# Patient Record
Sex: FEMALE | Race: BLACK OR AFRICAN AMERICAN | NOT HISPANIC OR LATINO | Employment: STUDENT | URBAN - METROPOLITAN AREA
[De-identification: names, ages, dates, MRNs, and addresses within clinical notes are randomized per-mention and may not be internally consistent; named-entity substitution may affect disease eponyms.]

---

## 2020-03-06 ENCOUNTER — TELEPHONE (OUTPATIENT)
Dept: OBSTETRICS AND GYNECOLOGY | Facility: CLINIC | Age: 30
End: 2020-03-06

## 2020-03-06 DIAGNOSIS — Z30.2 ENCOUNTER FOR STERILIZATION: Primary | ICD-10-CM

## 2020-03-10 ENCOUNTER — OFFICE VISIT (OUTPATIENT)
Dept: OBSTETRICS AND GYNECOLOGY | Facility: CLINIC | Age: 30
End: 2020-03-10
Payer: COMMERCIAL

## 2020-03-10 VITALS — HEIGHT: 64 IN | BODY MASS INDEX: 49.68 KG/M2 | WEIGHT: 291 LBS

## 2020-03-10 DIAGNOSIS — E66.01 MORBID OBESITY WITH BMI OF 45.0-49.9, ADULT: ICD-10-CM

## 2020-03-10 DIAGNOSIS — Z01.419 ENCOUNTER FOR GYNECOLOGICAL EXAMINATION WITHOUT ABNORMAL FINDING: Primary | ICD-10-CM

## 2020-03-10 DIAGNOSIS — N89.8 VAGINAL DISCHARGE: ICD-10-CM

## 2020-03-10 PROCEDURE — 87481 CANDIDA DNA AMP PROBE: CPT | Mod: 59

## 2020-03-10 PROCEDURE — 87624 HPV HI-RISK TYP POOLED RSLT: CPT

## 2020-03-10 PROCEDURE — 99999 PR PBB SHADOW E&M-EST. PATIENT-LVL II: CPT | Mod: PBBFAC,,, | Performed by: OBSTETRICS & GYNECOLOGY

## 2020-03-10 PROCEDURE — 87661 TRICHOMONAS VAGINALIS AMPLIF: CPT

## 2020-03-10 PROCEDURE — 99385 PREV VISIT NEW AGE 18-39: CPT | Mod: S$GLB,,, | Performed by: OBSTETRICS & GYNECOLOGY

## 2020-03-10 PROCEDURE — 99999 PR PBB SHADOW E&M-EST. PATIENT-LVL II: ICD-10-PCS | Mod: PBBFAC,,, | Performed by: OBSTETRICS & GYNECOLOGY

## 2020-03-10 PROCEDURE — 87591 N.GONORRHOEAE DNA AMP PROB: CPT

## 2020-03-10 PROCEDURE — 99385 PR PREVENTIVE VISIT,NEW,18-39: ICD-10-PCS | Mod: S$GLB,,, | Performed by: OBSTETRICS & GYNECOLOGY

## 2020-03-10 PROCEDURE — 88175 CYTOPATH C/V AUTO FLUID REDO: CPT

## 2020-03-11 ENCOUNTER — TELEPHONE (OUTPATIENT)
Dept: OBSTETRICS AND GYNECOLOGY | Facility: CLINIC | Age: 30
End: 2020-03-11

## 2020-03-11 LAB
BACTERIAL VAGINOSIS DNA: POSITIVE
C TRACH DNA SPEC QL NAA+PROBE: NOT DETECTED
CANDIDA GLABRATA DNA: NEGATIVE
CANDIDA KRUSEI DNA: NEGATIVE
CANDIDA RRNA VAG QL PROBE: NEGATIVE
N GONORRHOEA DNA SPEC QL NAA+PROBE: NOT DETECTED
T VAGINALIS RRNA GENITAL QL PROBE: POSITIVE

## 2020-03-11 NOTE — TELEPHONE ENCOUNTER
I spoke to the pt and informed her to wait until her new insurance start and we can restart her process for the IUD.

## 2020-03-11 NOTE — TELEPHONE ENCOUNTER
----- Message from Graeme Jean sent at 3/11/2020  3:48 PM CDT -----  Contact: NEOMI GRANADO [[id number]]  Name of Who is Calling: NOEMI GRANADO [[id number]]      What is the request in detail: Would like to speak with staff in regards to knowing if she she needs to do the IUD process over again in April, when her insurance changed on the 1st. Please advise      Can the clinic reply by [facility]: yes      What [facility] to [facility] Back [facility]: [phone]

## 2020-03-12 ENCOUNTER — PATIENT MESSAGE (OUTPATIENT)
Dept: OBSTETRICS AND GYNECOLOGY | Facility: CLINIC | Age: 30
End: 2020-03-12

## 2020-03-12 RX ORDER — METRONIDAZOLE 500 MG/1
500 TABLET ORAL 2 TIMES DAILY
Qty: 14 TABLET | Refills: 0 | Status: SHIPPED | OUTPATIENT
Start: 2020-03-12 | End: 2020-03-19

## 2020-03-17 LAB
HPV HR 12 DNA SPEC QL NAA+PROBE: NEGATIVE
HPV16 AG SPEC QL: NEGATIVE
HPV18 DNA SPEC QL NAA+PROBE: NEGATIVE

## 2020-03-23 NOTE — PROGRESS NOTES
HISTORY OF PRESENT ILLNESS:    Jennifer Sanches is a 29 y.o. female, , Patient's last menstrual period was 2020.,  presents for a routine exam and has no complaints.    Past Medical History:   Diagnosis Date    Abnormal Pap smear     colpo    Abnormal Pap smear of cervix        Past Surgical History:   Procedure Laterality Date    WISDOM TOOTH EXTRACTION         MEDICATIONS AND ALLERGIES:      Current Outpatient Medications:     hydrocodone-acetaminophen 5-325mg (NORCO) 5-325 mg per tablet, Take 1 tablet by mouth every 4 (four) hours as needed for Pain., Disp: , Rfl:     levonorgestrel (MIRENA) 20 mcg/24 hr (5 years) IUD, 1 each by Intrauterine route once., Disp: , Rfl:     Review of patient's allergies indicates:  No Known Allergies    Family History   Problem Relation Age of Onset    Hypertension Mother     Hypertension Father     No Known Problems Sister     Alzheimer's disease Paternal Grandmother     Breast cancer Neg Hx     Colon cancer Neg Hx     Ovarian cancer Neg Hx        Social History     Socioeconomic History    Marital status: Single     Spouse name: Not on file    Number of children: Not on file    Years of education: Not on file    Highest education level: Not on file   Occupational History    Not on file   Social Needs    Financial resource strain: Not on file    Food insecurity:     Worry: Not on file     Inability: Not on file    Transportation needs:     Medical: Not on file     Non-medical: Not on file   Tobacco Use    Smoking status: Never Smoker    Smokeless tobacco: Never Used   Substance and Sexual Activity    Alcohol use: No     Comment: 2 x month    Drug use: No    Sexual activity: Yes     Partners: Male     Birth control/protection: IUD   Lifestyle    Physical activity:     Days per week: Not on file     Minutes per session: Not on file    Stress: Not on file   Relationships    Social connections:     Talks on phone: Not on file     Gets together:  "Not on file     Attends Jewish service: Not on file     Active member of club or organization: Not on file     Attends meetings of clubs or organizations: Not on file     Relationship status: Not on file   Other Topics Concern    Not on file   Social History Narrative    Not on file       COMPREHENSIVE GYN HISTORY:  PAP History: Denies abnormal Paps.  Infection History: Denies STDs. Denies PID.  Benign History: Denies uterine fibroids. Denies ovarian cysts. Denies endometriosis. Denies other conditions.  Cancer History: Denies cervical cancer. Denies uterine cancer or hyperplasia. Denies ovarian cancer. Denies vulvar cancer or pre-cancer. Denies vaginal cancer or pre-cancer. Denies breast cancer. Denies colon cancer.  Sexual Activity History: Reports currently being sexually active  Menstrual History: Monthly. Mod then light flow.   Dysmenorrhea History: Reports mild dysmenorrhea.       ROS:  GENERAL: No weight changes. No swelling. No fatigue. No fever.  CARDIOVASCULAR: No chest pain. No shortness of breath. No leg cramps.   NEUROLOGICAL: No headaches. No vision changes.  BREASTS: No pain. No lumps. No discharge.  ABDOMEN: No pain. No nausea. No vomiting. No diarrhea. No constipation.  REPRODUCTIVE: No abnormal bleeding.   VULVA: No pain. No lesions. No itching.  VAGINA: No relaxation. No itching. No odor. No discharge. No lesions.  URINARY: No incontinence. No nocturia. No frequency. No dysuria.    Ht 5' 4" (1.626 m)   Wt 132 kg (291 lb 0.1 oz)   LMP 02/18/2020   BMI 49.95 kg/m²     PE:  APPEARANCE: Well nourished, well developed, in no acute distress.  AFFECT: WNL, alert and oriented x 3.  SKIN: No acne or hirsutism.  NECK: Neck symmetric, without masses or thyromegaly.  NODES: No inguinal, cervical, axillary or femoral lymph node enlargement.  CHEST: Good respiratory effort.   ABDOMEN: Soft. No tenderness or masses. No hepatosplenomegaly. No hernias.  BREASTS: Symmetrical, no skin changes, visible " lesions, palpable masses or nipple discharge bilaterally.  PELVIC: External female genitalia without lesions.  Female hair distribution. Adequate perineal body, Normal urethral meatus. Vagina moist and well rugated without lesions or discharge.  No significant cystocele or rectocele present. Cervix pink without lesions, discharge or tenderness. Uterus is 4-6 week size, regular, mobile and nontender. Adnexa without masses or tenderness.  EXTREMITIES: No edema    DIAGNOSIS:  1. Encounter for gynecological examination without abnormal finding    2. Vaginal discharge    3. Morbid obesity with BMI of 45.0-49.9, adult        PLAN:    Orders Placed This Encounter    HPV High Risk Genotypes, PCR    Vaginosis Screen by DNA Probe    C. trachomatis/N. gonorrhoeae by AMP DNA    Liquid-Based Pap Smear, Screening       COUNSELING:  The patient was counseled today on:  -A.C.S. Pap and pelvic exam guidelines (pap every 3 years), recomendations for yearly mammogram;  -to follow up with her PCP for other health maintenance.    FOLLOW-UP with me annually.

## 2020-04-02 ENCOUNTER — PATIENT MESSAGE (OUTPATIENT)
Dept: OBSTETRICS AND GYNECOLOGY | Facility: CLINIC | Age: 30
End: 2020-04-02

## 2020-04-02 LAB
FINAL PATHOLOGIC DIAGNOSIS: NORMAL
Lab: NORMAL

## 2020-04-03 ENCOUNTER — OFFICE VISIT (OUTPATIENT)
Dept: OBSTETRICS AND GYNECOLOGY | Facility: CLINIC | Age: 30
End: 2020-04-03
Payer: COMMERCIAL

## 2020-04-03 DIAGNOSIS — Z30.9 ENCOUNTER FOR CONTRACEPTIVE MANAGEMENT, UNSPECIFIED TYPE: Primary | ICD-10-CM

## 2020-04-03 PROCEDURE — 99213 PR OFFICE/OUTPT VISIT, EST, LEVL III, 20-29 MIN: ICD-10-PCS | Mod: 95,,, | Performed by: OBSTETRICS & GYNECOLOGY

## 2020-04-03 PROCEDURE — 99213 OFFICE O/P EST LOW 20 MIN: CPT | Mod: 95,,, | Performed by: OBSTETRICS & GYNECOLOGY

## 2020-04-03 NOTE — PROGRESS NOTES
HISTORY OF PRESENT ILLNESS:    Jennifer Sanches is a 29 y.o. female  No LMP recorded. (Menstrual status: Birth Control). presents today for follow-up to discuss contraception.    Patient is single. New relationship. Tooks meds for trichomonas, partner not treated.   MIrena IUD in place, finished Flagyl.  Long discussion held with patient secondary to the criteria for IUD placement and being in a mutually monogamous relationship a low risk for STDs.  Disucssed other options for birth control. Patient desires Nexplanon.     Past Medical History:   Diagnosis Date    Abnormal Pap smear     colpo    Abnormal Pap smear of cervix        Past Surgical History:   Procedure Laterality Date    WISDOM TOOTH EXTRACTION         MEDICATIONS AND ALLERGIES:      Current Outpatient Medications:     hydrocodone-acetaminophen 5-325mg (NORCO) 5-325 mg per tablet, Take 1 tablet by mouth every 4 (four) hours as needed for Pain., Disp: , Rfl:     levonorgestrel (MIRENA) 20 mcg/24 hr (5 years) IUD, 1 each by Intrauterine route once., Disp: , Rfl:     Review of patient's allergies indicates:  No Known Allergies    COMPREHENSIVE GYN HISTORY:  PAP History: Denies abnormal Paps.  Infection History: Denies STDs. Denies PID.  Benign History: Denies uterine fibroids. Denies ovarian cysts. Denies endometriosis. Denies other conditions.  Cancer History: Denies cervical cancer. Denies uterine cancer or hyperplasia. Denies ovarian cancer. Denies vulvar cancer or pre-cancer. Denies vaginal cancer or pre-cancer. Denies breast cancer. Denies colon cancer.  Sexual Activity History: Reports currently being sexually active  Menstrual History: Every 28 days, flows for 4 days. Light flow.  Dysmenorrhea History: Denies dysmenorrhea.    ROS:  GENERAL: No fever or chills.  BREASTS: No pain. No lumps. No discharge.  ABDOMEN: No pain. No nausea. No vomiting. No diarrhea. No constipation.  REPRODUCTIVE: No abnormal bleeding.   VULVA: No pain. No  lesions. No itching.  VAGINA: No relaxation. No itching. No odor. No discharge. No lesions.  URINARY: No incontinence. No nocturia. No frequency. No dysuria.    PE:  APPEARANCE: Well nourished, well developed, in no acute distress.  AFFECT: WNL, alert and oriented x 3.  Deferred     1. Encounter for contraceptive management, unspecified type        COUNSELING:  The patient was counseled today on:    FOLLOW-UP with me in 3 weeks for test of cure.

## 2020-04-06 ENCOUNTER — TELEPHONE (OUTPATIENT)
Dept: OBSTETRICS AND GYNECOLOGY | Facility: CLINIC | Age: 30
End: 2020-04-06

## 2020-04-06 DIAGNOSIS — Z30.9 ENCOUNTER FOR CONTRACEPTIVE MANAGEMENT, UNSPECIFIED TYPE: Primary | ICD-10-CM

## 2020-04-22 ENCOUNTER — OFFICE VISIT (OUTPATIENT)
Dept: OBSTETRICS AND GYNECOLOGY | Facility: CLINIC | Age: 30
End: 2020-04-22
Payer: COMMERCIAL

## 2020-04-22 ENCOUNTER — LAB VISIT (OUTPATIENT)
Dept: LAB | Facility: OTHER | Age: 30
End: 2020-04-22
Attending: OBSTETRICS & GYNECOLOGY
Payer: COMMERCIAL

## 2020-04-22 VITALS — WEIGHT: 286.63 LBS | BODY MASS INDEX: 48.93 KG/M2 | HEIGHT: 64 IN

## 2020-04-22 DIAGNOSIS — Z11.3 SCREENING EXAMINATION FOR STD (SEXUALLY TRANSMITTED DISEASE): Primary | ICD-10-CM

## 2020-04-22 DIAGNOSIS — Z11.3 SCREENING EXAMINATION FOR STD (SEXUALLY TRANSMITTED DISEASE): ICD-10-CM

## 2020-04-22 DIAGNOSIS — Z30.432 ENCOUNTER FOR IUD REMOVAL: ICD-10-CM

## 2020-04-22 DIAGNOSIS — Z30.9 ENCOUNTER FOR CONTRACEPTIVE MANAGEMENT, UNSPECIFIED TYPE: ICD-10-CM

## 2020-04-22 PROCEDURE — 99999 PR PBB SHADOW E&M-EST. PATIENT-LVL II: ICD-10-PCS | Mod: PBBFAC,,, | Performed by: OBSTETRICS & GYNECOLOGY

## 2020-04-22 PROCEDURE — 80074 ACUTE HEPATITIS PANEL: CPT

## 2020-04-22 PROCEDURE — 87481 CANDIDA DNA AMP PROBE: CPT | Mod: 59

## 2020-04-22 PROCEDURE — 99213 OFFICE O/P EST LOW 20 MIN: CPT | Mod: 25,S$GLB,, | Performed by: OBSTETRICS & GYNECOLOGY

## 2020-04-22 PROCEDURE — 87491 CHLMYD TRACH DNA AMP PROBE: CPT | Mod: 59

## 2020-04-22 PROCEDURE — 99999 PR PBB SHADOW E&M-EST. PATIENT-LVL II: CPT | Mod: PBBFAC,,, | Performed by: OBSTETRICS & GYNECOLOGY

## 2020-04-22 PROCEDURE — 86703 HIV-1/HIV-2 1 RESULT ANTBDY: CPT

## 2020-04-22 PROCEDURE — 3008F BODY MASS INDEX DOCD: CPT | Mod: CPTII,S$GLB,, | Performed by: OBSTETRICS & GYNECOLOGY

## 2020-04-22 PROCEDURE — 36415 COLL VENOUS BLD VENIPUNCTURE: CPT

## 2020-04-22 PROCEDURE — 99213 PR OFFICE/OUTPT VISIT, EST, LEVL III, 20-29 MIN: ICD-10-PCS | Mod: 25,S$GLB,, | Performed by: OBSTETRICS & GYNECOLOGY

## 2020-04-22 PROCEDURE — 86592 SYPHILIS TEST NON-TREP QUAL: CPT

## 2020-04-22 PROCEDURE — 58301 PR REMOVE, INTRAUTERINE DEVICE: ICD-10-PCS | Mod: S$GLB,,, | Performed by: OBSTETRICS & GYNECOLOGY

## 2020-04-22 PROCEDURE — 3008F PR BODY MASS INDEX (BMI) DOCUMENTED: ICD-10-PCS | Mod: CPTII,S$GLB,, | Performed by: OBSTETRICS & GYNECOLOGY

## 2020-04-22 PROCEDURE — 58301 REMOVE INTRAUTERINE DEVICE: CPT | Mod: S$GLB,,, | Performed by: OBSTETRICS & GYNECOLOGY

## 2020-04-23 LAB
C TRACH DNA SPEC QL NAA+PROBE: NOT DETECTED
HAV IGM SERPL QL IA: NEGATIVE
HBV CORE IGM SERPL QL IA: NEGATIVE
HBV SURFACE AG SERPL QL IA: NEGATIVE
HCV AB SERPL QL IA: NEGATIVE
HIV 1+2 AB+HIV1 P24 AG SERPL QL IA: NEGATIVE
N GONORRHOEA DNA SPEC QL NAA+PROBE: NOT DETECTED
RPR SER QL: NORMAL

## 2020-04-24 ENCOUNTER — TELEPHONE (OUTPATIENT)
Dept: OBSTETRICS AND GYNECOLOGY | Facility: CLINIC | Age: 30
End: 2020-04-24

## 2020-04-24 DIAGNOSIS — A59.01 VAGINAL TRICHOMONIASIS: Primary | ICD-10-CM

## 2020-04-24 DIAGNOSIS — Z30.017 ENCOUNTER FOR INITIAL PRESCRIPTION OF NEXPLANON: Primary | ICD-10-CM

## 2020-04-24 LAB
BACTERIAL VAGINOSIS DNA: POSITIVE
CANDIDA GLABRATA DNA: NEGATIVE
CANDIDA KRUSEI DNA: NEGATIVE
CANDIDA RRNA VAG QL PROBE: NEGATIVE
T VAGINALIS RRNA GENITAL QL PROBE: POSITIVE

## 2020-04-24 RX ORDER — METRONIDAZOLE 500 MG/1
500 TABLET ORAL 2 TIMES DAILY
Qty: 14 TABLET | Refills: 0 | Status: SHIPPED | OUTPATIENT
Start: 2020-04-24 | End: 2020-05-01

## 2020-04-24 NOTE — PROGRESS NOTES
HISTORY OF PRESENT ILLNESS:    Jennifer Sanches is a 29 y.o. female  No LMP recorded. (Menstrual status: Birth Control). presents today test to cure after treatment for Trichomonas.  IUD has been in place for over 5 years planned removal today.  The use of the oral contraceptive, Depo Provera, Nexplanon, Nuva Ring and IUD has been fully discussed with the patient. Warnings about anticipated minor side effects such as breakthrough spotting, nausea, breast tenderness, weight changes, acne, headaches, etc. She has been told of the more serious potential side effects such as MI, stroke, and deep vein thrombosis, all of which are very unlikely. She has been asked to report any signs of such serious problems immediately.  The need for additional protection, such as a condom, during the first month of taking pills, while taking antibiotics and to prevent exposure to sexually transmitted diseases has also been discussed- the patient has been clearly reminded that contraception cannot protect her against diseases such as HIV and others. She understands and wishes to start Nexplanon.     Past Medical History:   Diagnosis Date    Abnormal Pap smear     colpo    Abnormal Pap smear of cervix        Past Surgical History:   Procedure Laterality Date    WISDOM TOOTH EXTRACTION         MEDICATIONS AND ALLERGIES:      Current Outpatient Medications:     hydrocodone-acetaminophen 5-325mg (NORCO) 5-325 mg per tablet, Take 1 tablet by mouth every 4 (four) hours as needed for Pain., Disp: , Rfl:     Review of patient's allergies indicates:  No Known Allergies    COMPREHENSIVE GYN HISTORY:  PAP History: Denies abnormal Paps.  Infection History: Denies STDs. Denies PID.  Benign History: Denies uterine fibroids. Denies ovarian cysts. Denies endometriosis. Denies other conditions.  Cancer History: Denies cervical cancer. Denies uterine cancer or hyperplasia. Denies ovarian cancer. Denies vulvar cancer or pre-cancer. Denies  vaginal cancer or pre-cancer. Denies breast cancer. Denies colon cancer.  Sexual Activity History: Reports currently being sexually active  Menstrual History: Every 28 days, flows for 4 days. Light flow.  Dysmenorrhea History: Denies dysmenorrhea.    ROS:  GENERAL: No fever or chills.  BREASTS: No pain. No lumps. No discharge.  ABDOMEN: No pain. No nausea. No vomiting. No diarrhea. No constipation.  REPRODUCTIVE: No abnormal bleeding.   VULVA: No pain. No lesions. No itching.  VAGINA: No relaxation. No itching. No odor. No discharge. No lesions.  URINARY: No incontinence. No nocturia. No frequency. No dysuria.    PE:  APPEARANCE: Well nourished, well developed, in no acute distress.  AFFECT: WNL, alert and oriented x 3.  ABDOMEN: Soft. No tenderness or masses. No hepatosplenomegaly. No hernias.  BREASTS: Symmetrical, no skin changes or visible lesions. No palpable masses, nipple discharge bilaterally.  PELVIC: Normal external female genitalia without lesions. Normal hair distribution. Adequate perineal body, normal urethral meatus. Vagina pink and well rugated without lesions or discharge. Cervix pink without lesions, discharge or tenderness. No significant cystocele or rectocele. Bimanual exam shows uterus to be 4-6 weeks size, regular, mobile and nontender. Adnexa without masses or tenderness.      1. Screening examination for STD (sexually transmitted disease)    2. Encounter for IUD removal    3. Encounter for contraceptive management, unspecified type        PLAN:    Orders Placed This Encounter    Vaginosis Screen by DNA Probe    C. trachomatis/N. gonorrhoeae by AMP DNA    Hepatitis Panel, Acute    HIV 1/2 Ag/Ab (4th Gen)    RPR     FOLLOW-UP with me for Nexplanon placement   Answers for HPI/ROS submitted by the patient on 4/22/2020   Gynecologic exam  genital itching: No  genital lesions: No  genital odor: No  genital rash: No  missed menses: No  pelvic pain: No  vaginal bleeding: No  vaginal discharge:  No

## 2020-10-22 ENCOUNTER — PATIENT MESSAGE (OUTPATIENT)
Dept: OBSTETRICS AND GYNECOLOGY | Facility: CLINIC | Age: 30
End: 2020-10-22

## 2020-11-11 ENCOUNTER — TELEPHONE (OUTPATIENT)
Dept: OBSTETRICS AND GYNECOLOGY | Facility: CLINIC | Age: 30
End: 2020-11-11

## 2020-11-11 NOTE — TELEPHONE ENCOUNTER
----- Message from Leah Allen sent at 11/11/2020  1:23 PM CST -----  Type: Patient Call Back    Who called: pt     What is the request in detail: pt calling to check on the status of Mychart message on 10/22. Pt states she would like to have patch instead of IUD. Pt asking for a call back regarding.     Can the clinic reply by [facility]? No     Would the patient rather a call back or a response via [facility]? Call back     Best call back number: [phone] (home)     Additional Information:       Thank You

## 2020-11-13 ENCOUNTER — TELEPHONE (OUTPATIENT)
Dept: OBSTETRICS AND GYNECOLOGY | Facility: CLINIC | Age: 30
End: 2020-11-13

## 2020-11-13 NOTE — TELEPHONE ENCOUNTER
----- Message from Tone Byrne sent at 11/11/2020  3:27 PM CST -----  Patient is returning missed phone call, patient phone number is [phone]. Thanks!

## 2020-11-30 ENCOUNTER — TELEPHONE (OUTPATIENT)
Dept: OBSTETRICS AND GYNECOLOGY | Facility: CLINIC | Age: 30
End: 2020-11-30

## 2020-11-30 NOTE — TELEPHONE ENCOUNTER
----- Message from Tone Byrne sent at 11/30/2020  1:29 PM CST -----  Patient is calling in regards to birth control prescription, the patch. Pt phone number is [phone]. Thanks

## 2020-11-30 NOTE — TELEPHONE ENCOUNTER
Called patient, patient stated she was calling because she has left messages in the past weeks and nothing was sent in for her. Patient stated she was calling because she got her Mirena IUD removed months ago and was going to get the Nexplanon but after doing some research patient stated she does not want the Nexplanon as noted in her chart on 10/22/20. Patient stated she would like a Rx for the birth control patch sent to her pharmacy. Informed patient I would send message to provider for approval.

## 2020-12-08 ENCOUNTER — TELEPHONE (OUTPATIENT)
Dept: OBSTETRICS AND GYNECOLOGY | Facility: CLINIC | Age: 30
End: 2020-12-08

## 2020-12-15 ENCOUNTER — OFFICE VISIT (OUTPATIENT)
Dept: OBSTETRICS AND GYNECOLOGY | Facility: CLINIC | Age: 30
End: 2020-12-15
Payer: COMMERCIAL

## 2020-12-15 DIAGNOSIS — Z30.9 ENCOUNTER FOR CONTRACEPTIVE MANAGEMENT, UNSPECIFIED TYPE: Primary | ICD-10-CM

## 2020-12-15 PROCEDURE — 99212 OFFICE O/P EST SF 10 MIN: CPT | Mod: 95,,, | Performed by: OBSTETRICS & GYNECOLOGY

## 2020-12-15 PROCEDURE — 99212 PR OFFICE/OUTPT VISIT, EST, LEVL II, 10-19 MIN: ICD-10-PCS | Mod: 95,,, | Performed by: OBSTETRICS & GYNECOLOGY

## 2020-12-15 NOTE — PROGRESS NOTES
HISTORY OF PRESENT ILLNESS:    Jennifer Sanches is a 30 y.o. female  No LMP recorded. (Menstrual status: Birth Control). presents today to discuss contraceptive options.   She is concerned about compliance.   The use of the oral contraceptive, Depo Provera, Nexplanon, Nuva Ring and IUD has been fully discussed with the patient. Warnings about anticipated minor side effects such as breakthrough spotting, nausea, breast tenderness, weight changes, acne, headaches, etc. She has been told of the more serious potential side effects such as MI, stroke, and deep vein thrombosis, all of which are very unlikely. She has been asked to report any signs of such serious problems immediately.  The need for additional protection, such as a condom, during the first month of taking pills, while taking antibiotics and to prevent exposure to sexually transmitted diseases has also been discussed- the patient has been clearly reminded that contraception cannot protect her against diseases such as HIV and others. She understands and wishes to start Nuva Ring.     The patient location is: [place], [place]   The chief complaint leading to consultation is: Contraception   Visit type: Virtual visit with synchronous audio and video  Total time spent with patient: 15 minutes (Over 50% of time is spent in counseling and coordination of care such as virtual prenatal classes, COVID-19 precautions, Labor, ROM and VB precautions given).    Each patient to whom he or she provides medical services by telemedicine is:  (1) informed of the relationship between the physician and patient and the respective role of any other health care provider with respect to management of the patient; and (2) notified that he or she may decline to receive medical services by telemedicine and may withdraw from such care at any time.      Past Medical History:   Diagnosis Date    Abnormal Pap smear     colpo    Abnormal Pap smear of cervix        Past Surgical  History:   Procedure Laterality Date    WISDOM TOOTH EXTRACTION         MEDICATIONS AND ALLERGIES:      Current Outpatient Medications:     hydrocodone-acetaminophen 5-325mg (NORCO) 5-325 mg per tablet, Take 1 tablet by mouth every 4 (four) hours as needed for Pain., Disp: , Rfl:     Review of patient's allergies indicates:  No Known Allergies    COMPREHENSIVE GYN HISTORY:  PAP History: Denies abnormal Paps.  Infection History: Denies STDs. Denies PID.  Benign History: Denies uterine fibroids. Denies ovarian cysts. Denies endometriosis. Denies other conditions.  Cancer History: Denies cervical cancer. Denies uterine cancer or hyperplasia. Denies ovarian cancer. Denies vulvar cancer or pre-cancer. Denies vaginal cancer or pre-cancer. Denies breast cancer. Denies colon cancer.  Sexual Activity History: Reports currently being sexually active  Menstrual History: Every 28 days, flows for 4 days. Light flow.  Dysmenorrhea History: Denies dysmenorrhea.       ROS:  GENERAL: No fever or chills.  BREASTS: No pain. No lumps. No discharge.  ABDOMEN: No pain. No nausea. No vomiting. No diarrhea. No constipation.  REPRODUCTIVE: No abnormal bleeding.   VULVA: No pain. No lesions. No itching.  VAGINA: No relaxation. No itching. No odor. No discharge. No lesions.  URINARY: No incontinence. No nocturia. No frequency. No dysuria.    PE:  APPEARANCE: Well nourished, well developed, in no acute distress.  AFFECT: WNL, alert and oriented x 3.  Deferred     1. Encounter for contraceptive management, unspecified type        FOLLOW-UP with me pending test results.

## 2020-12-24 ENCOUNTER — OFFICE VISIT (OUTPATIENT)
Dept: OBSTETRICS AND GYNECOLOGY | Facility: CLINIC | Age: 30
End: 2020-12-24
Payer: COMMERCIAL

## 2020-12-24 VITALS — WEIGHT: 291 LBS | BODY MASS INDEX: 49.68 KG/M2 | HEIGHT: 64 IN

## 2020-12-24 DIAGNOSIS — Z11.3 SCREENING EXAMINATION FOR STD (SEXUALLY TRANSMITTED DISEASE): ICD-10-CM

## 2020-12-24 DIAGNOSIS — Z30.9 ENCOUNTER FOR CONTRACEPTIVE MANAGEMENT, UNSPECIFIED TYPE: Primary | ICD-10-CM

## 2020-12-24 LAB
CANDIDA RRNA VAG QL PROBE: NEGATIVE
G VAGINALIS RRNA GENITAL QL PROBE: POSITIVE
T VAGINALIS RRNA GENITAL QL PROBE: POSITIVE

## 2020-12-24 PROCEDURE — 99999 PR PBB SHADOW E&M-EST. PATIENT-LVL II: ICD-10-PCS | Mod: PBBFAC,,, | Performed by: OBSTETRICS & GYNECOLOGY

## 2020-12-24 PROCEDURE — 1126F AMNT PAIN NOTED NONE PRSNT: CPT | Mod: S$GLB,,, | Performed by: OBSTETRICS & GYNECOLOGY

## 2020-12-24 PROCEDURE — 3008F PR BODY MASS INDEX (BMI) DOCUMENTED: ICD-10-PCS | Mod: CPTII,S$GLB,, | Performed by: OBSTETRICS & GYNECOLOGY

## 2020-12-24 PROCEDURE — 3008F BODY MASS INDEX DOCD: CPT | Mod: CPTII,S$GLB,, | Performed by: OBSTETRICS & GYNECOLOGY

## 2020-12-24 PROCEDURE — 99999 PR PBB SHADOW E&M-EST. PATIENT-LVL II: CPT | Mod: PBBFAC,,, | Performed by: OBSTETRICS & GYNECOLOGY

## 2020-12-24 PROCEDURE — 99213 PR OFFICE/OUTPT VISIT, EST, LEVL III, 20-29 MIN: ICD-10-PCS | Mod: S$GLB,,, | Performed by: OBSTETRICS & GYNECOLOGY

## 2020-12-24 PROCEDURE — 87480 CANDIDA DNA DIR PROBE: CPT

## 2020-12-24 PROCEDURE — 87510 GARDNER VAG DNA DIR PROBE: CPT

## 2020-12-24 PROCEDURE — 1126F PR PAIN SEVERITY QUANTIFIED, NO PAIN PRESENT: ICD-10-PCS | Mod: S$GLB,,, | Performed by: OBSTETRICS & GYNECOLOGY

## 2020-12-24 PROCEDURE — 99213 OFFICE O/P EST LOW 20 MIN: CPT | Mod: S$GLB,,, | Performed by: OBSTETRICS & GYNECOLOGY

## 2020-12-24 NOTE — PROGRESS NOTES
HISTORY OF PRESENT ILLNESS:    Jennifer Sanches is a 30 y.o. female  Patient's last menstrual period was 2020. presents today for follow up.   She desires to have IUD replaced. Needs LIZ done.      Past Medical History:   Diagnosis Date    Abnormal Pap smear     colpo    Abnormal Pap smear of cervix        Past Surgical History:   Procedure Laterality Date    WISDOM TOOTH EXTRACTION         MEDICATIONS AND ALLERGIES:    No current outpatient medications on file.    Review of patient's allergies indicates:  No Known Allergies    COMPREHENSIVE GYN HISTORY:  PAP History: Denies abnormal Paps.  Infection History: Denies STDs. Denies PID.  Benign History: Denies uterine fibroids. Denies ovarian cysts. Denies endometriosis. Denies other conditions.  Cancer History: Denies cervical cancer. Denies uterine cancer or hyperplasia. Denies ovarian cancer. Denies vulvar cancer or pre-cancer. Denies vaginal cancer or pre-cancer. Denies breast cancer. Denies colon cancer.  Sexual Activity History: Reports currently being sexually active  Menstrual History: Every 28 days, flows for 4 days. Light flow.  Dysmenorrhea History: Denies dysmenorrhea.    ROS:  GENERAL: No fever or chills.  BREASTS: No pain. No lumps. No discharge.  ABDOMEN: No pain. No nausea. No vomiting. No diarrhea. No constipation.  REPRODUCTIVE: No abnormal bleeding.   VULVA: No pain. No lesions. No itching.  VAGINA: No relaxation. No itching. No odor. No discharge. No lesions.  URINARY: No incontinence. No nocturia. No frequency. No dysuria.    PE:  APPEARANCE: Well nourished, well developed, in no acute distress.  AFFECT: WNL, alert and oriented x 3.  ABDOMEN: Soft. No tenderness or masses. No hepatosplenomegaly. No hernias.  PELVIC: Normal external female genitalia without lesions. Normal hair distribution. Adequate perineal body, normal urethral meatus. Vagina pink and well rugated without lesions or discharge. Cervix pink without lesions,  discharge or tenderness. No significant cystocele or rectocele. Bimanual exam shows uterus to be 4-6 weeks size, regular, mobile and nontender. Adnexa without masses or tenderness.      1. Encounter for contraceptive management, unspecified type    2. Screening examination for STD (sexually transmitted disease)        PLAN:    Orders Placed This Encounter    Vaginosis Screen by DNA Probe    C. trachomatis/N. gonorrhoeae by AMP DNA    Device Authorization Order       COUNSELING:  The patient was counseled today on:    FOLLOW-UP with me pending test results.

## 2020-12-29 LAB
C TRACH RRNA SPEC QL NAA+PROBE: NEGATIVE
N GONORRHOEA RRNA SPEC QL NAA+PROBE: NEGATIVE

## 2021-01-05 ENCOUNTER — PATIENT MESSAGE (OUTPATIENT)
Dept: OBSTETRICS AND GYNECOLOGY | Facility: CLINIC | Age: 31
End: 2021-01-05

## 2021-01-08 RX ORDER — METRONIDAZOLE 500 MG/1
500 TABLET ORAL 2 TIMES DAILY
Qty: 14 TABLET | Refills: 0 | Status: SHIPPED | OUTPATIENT
Start: 2021-01-08 | End: 2021-01-15

## 2021-01-08 RX ORDER — TINIDAZOLE 500 MG/1
500 TABLET ORAL 2 TIMES DAILY
Qty: 14 TABLET | Refills: 0 | OUTPATIENT
Start: 2021-01-08 | End: 2021-01-13

## 2021-02-26 ENCOUNTER — IMMUNIZATION (OUTPATIENT)
Dept: PHARMACY | Facility: CLINIC | Age: 31
End: 2021-02-26
Payer: COMMERCIAL

## 2021-02-26 DIAGNOSIS — Z23 NEED FOR VACCINATION: Primary | ICD-10-CM

## 2021-03-26 ENCOUNTER — IMMUNIZATION (OUTPATIENT)
Dept: PHARMACY | Facility: CLINIC | Age: 31
End: 2021-03-26
Payer: COMMERCIAL

## 2021-03-26 DIAGNOSIS — Z23 NEED FOR VACCINATION: Primary | ICD-10-CM

## 2021-04-22 ENCOUNTER — PATIENT MESSAGE (OUTPATIENT)
Dept: UROLOGY | Facility: CLINIC | Age: 31
End: 2021-04-22

## 2021-09-10 ENCOUNTER — OFFICE VISIT (OUTPATIENT)
Dept: URGENT CARE | Facility: CLINIC | Age: 31
End: 2021-09-10
Payer: COMMERCIAL

## 2021-09-10 VITALS
BODY MASS INDEX: 49.68 KG/M2 | RESPIRATION RATE: 16 BRPM | HEART RATE: 86 BPM | WEIGHT: 291 LBS | HEIGHT: 64 IN | OXYGEN SATURATION: 100 % | TEMPERATURE: 98 F | SYSTOLIC BLOOD PRESSURE: 124 MMHG | DIASTOLIC BLOOD PRESSURE: 78 MMHG

## 2021-09-10 DIAGNOSIS — J02.9 SORE THROAT: Primary | ICD-10-CM

## 2021-09-10 LAB
CTP QC/QA: YES
SARS-COV-2 RDRP RESP QL NAA+PROBE: NEGATIVE

## 2021-09-10 PROCEDURE — 3008F BODY MASS INDEX DOCD: CPT | Mod: CPTII,S$GLB,, | Performed by: FAMILY MEDICINE

## 2021-09-10 PROCEDURE — 3078F DIAST BP <80 MM HG: CPT | Mod: CPTII,S$GLB,, | Performed by: FAMILY MEDICINE

## 2021-09-10 PROCEDURE — 3008F PR BODY MASS INDEX (BMI) DOCUMENTED: ICD-10-PCS | Mod: CPTII,S$GLB,, | Performed by: FAMILY MEDICINE

## 2021-09-10 PROCEDURE — U0002 COVID-19 LAB TEST NON-CDC: HCPCS | Mod: QW,S$GLB,, | Performed by: FAMILY MEDICINE

## 2021-09-10 PROCEDURE — U0002: ICD-10-PCS | Mod: QW,S$GLB,, | Performed by: FAMILY MEDICINE

## 2021-09-10 PROCEDURE — 99203 OFFICE O/P NEW LOW 30 MIN: CPT | Mod: 25,S$GLB,CS, | Performed by: FAMILY MEDICINE

## 2021-09-10 PROCEDURE — 96372 PR INJECTION,THERAP/PROPH/DIAG2ST, IM OR SUBCUT: ICD-10-PCS | Mod: S$GLB,,, | Performed by: FAMILY MEDICINE

## 2021-09-10 PROCEDURE — 3074F PR MOST RECENT SYSTOLIC BLOOD PRESSURE < 130 MM HG: ICD-10-PCS | Mod: CPTII,S$GLB,, | Performed by: FAMILY MEDICINE

## 2021-09-10 PROCEDURE — 1160F PR REVIEW ALL MEDS BY PRESCRIBER/CLIN PHARMACIST DOCUMENTED: ICD-10-PCS | Mod: CPTII,S$GLB,, | Performed by: FAMILY MEDICINE

## 2021-09-10 PROCEDURE — 96372 THER/PROPH/DIAG INJ SC/IM: CPT | Mod: S$GLB,,, | Performed by: FAMILY MEDICINE

## 2021-09-10 PROCEDURE — 1159F MED LIST DOCD IN RCRD: CPT | Mod: CPTII,S$GLB,, | Performed by: FAMILY MEDICINE

## 2021-09-10 PROCEDURE — 3074F SYST BP LT 130 MM HG: CPT | Mod: CPTII,S$GLB,, | Performed by: FAMILY MEDICINE

## 2021-09-10 PROCEDURE — 1159F PR MEDICATION LIST DOCUMENTED IN MEDICAL RECORD: ICD-10-PCS | Mod: CPTII,S$GLB,, | Performed by: FAMILY MEDICINE

## 2021-09-10 PROCEDURE — 99203 PR OFFICE/OUTPT VISIT, NEW, LEVL III, 30-44 MIN: ICD-10-PCS | Mod: 25,S$GLB,CS, | Performed by: FAMILY MEDICINE

## 2021-09-10 PROCEDURE — 3078F PR MOST RECENT DIASTOLIC BLOOD PRESSURE < 80 MM HG: ICD-10-PCS | Mod: CPTII,S$GLB,, | Performed by: FAMILY MEDICINE

## 2021-09-10 PROCEDURE — 1160F RVW MEDS BY RX/DR IN RCRD: CPT | Mod: CPTII,S$GLB,, | Performed by: FAMILY MEDICINE

## 2021-09-10 RX ORDER — BETAMETHASONE SODIUM PHOSPHATE AND BETAMETHASONE ACETATE 3; 3 MG/ML; MG/ML
6 INJECTION, SUSPENSION INTRA-ARTICULAR; INTRALESIONAL; INTRAMUSCULAR; SOFT TISSUE ONCE
Status: COMPLETED | OUTPATIENT
Start: 2021-09-10 | End: 2021-09-10

## 2021-09-10 RX ORDER — AZELASTINE 1 MG/ML
1 SPRAY, METERED NASAL 2 TIMES DAILY
Qty: 30 ML | Refills: 3 | Status: SHIPPED | OUTPATIENT
Start: 2021-09-10 | End: 2024-03-08

## 2021-09-10 RX ORDER — FLUTICASONE PROPIONATE 50 MCG
1 SPRAY, SUSPENSION (ML) NASAL 2 TIMES DAILY
Qty: 16 G | Refills: 1 | Status: SHIPPED | OUTPATIENT
Start: 2021-09-10 | End: 2024-03-08

## 2021-09-10 RX ADMIN — BETAMETHASONE SODIUM PHOSPHATE AND BETAMETHASONE ACETATE 6 MG: 3; 3 INJECTION, SUSPENSION INTRA-ARTICULAR; INTRALESIONAL; INTRAMUSCULAR; SOFT TISSUE at 02:09

## 2022-01-03 ENCOUNTER — TELEPHONE (OUTPATIENT)
Dept: OBSTETRICS AND GYNECOLOGY | Facility: CLINIC | Age: 32
End: 2022-01-03
Payer: COMMERCIAL

## 2022-01-03 ENCOUNTER — LAB VISIT (OUTPATIENT)
Dept: LAB | Facility: OTHER | Age: 32
End: 2022-01-03
Attending: PHYSICIAN ASSISTANT
Payer: COMMERCIAL

## 2022-01-03 ENCOUNTER — TELEPHONE (OUTPATIENT)
Dept: OBSTETRICS AND GYNECOLOGY | Facility: CLINIC | Age: 32
End: 2022-01-03

## 2022-01-03 ENCOUNTER — PATIENT MESSAGE (OUTPATIENT)
Dept: PHARMACY | Facility: CLINIC | Age: 32
End: 2022-01-03
Payer: COMMERCIAL

## 2022-01-03 ENCOUNTER — OFFICE VISIT (OUTPATIENT)
Dept: OBSTETRICS AND GYNECOLOGY | Facility: CLINIC | Age: 32
End: 2022-01-03
Payer: COMMERCIAL

## 2022-01-03 VITALS
SYSTOLIC BLOOD PRESSURE: 118 MMHG | BODY MASS INDEX: 50.02 KG/M2 | HEIGHT: 64 IN | WEIGHT: 293 LBS | DIASTOLIC BLOOD PRESSURE: 82 MMHG

## 2022-01-03 DIAGNOSIS — Z01.419 WELL WOMAN EXAM: Primary | ICD-10-CM

## 2022-01-03 DIAGNOSIS — Z30.09 BIRTH CONTROL COUNSELING: ICD-10-CM

## 2022-01-03 DIAGNOSIS — Z11.3 SCREEN FOR STD (SEXUALLY TRANSMITTED DISEASE): ICD-10-CM

## 2022-01-03 DIAGNOSIS — Z23 NEED FOR HPV VACCINE: ICD-10-CM

## 2022-01-03 LAB
B-HCG UR QL: NEGATIVE
CTP QC/QA: YES

## 2022-01-03 PROCEDURE — 81025 POCT URINE PREGNANCY: ICD-10-PCS | Mod: S$GLB,,, | Performed by: PHYSICIAN ASSISTANT

## 2022-01-03 PROCEDURE — 1159F PR MEDICATION LIST DOCUMENTED IN MEDICAL RECORD: ICD-10-PCS | Mod: CPTII,S$GLB,, | Performed by: PHYSICIAN ASSISTANT

## 2022-01-03 PROCEDURE — 99395 PREV VISIT EST AGE 18-39: CPT | Mod: S$GLB,,, | Performed by: PHYSICIAN ASSISTANT

## 2022-01-03 PROCEDURE — 81025 URINE PREGNANCY TEST: CPT | Mod: S$GLB,,, | Performed by: PHYSICIAN ASSISTANT

## 2022-01-03 PROCEDURE — 1160F PR REVIEW ALL MEDS BY PRESCRIBER/CLIN PHARMACIST DOCUMENTED: ICD-10-PCS | Mod: CPTII,S$GLB,, | Performed by: PHYSICIAN ASSISTANT

## 2022-01-03 PROCEDURE — 99999 PR PBB SHADOW E&M-EST. PATIENT-LVL III: CPT | Mod: PBBFAC,,, | Performed by: PHYSICIAN ASSISTANT

## 2022-01-03 PROCEDURE — 3079F DIAST BP 80-89 MM HG: CPT | Mod: CPTII,S$GLB,, | Performed by: PHYSICIAN ASSISTANT

## 2022-01-03 PROCEDURE — 87591 N.GONORRHOEAE DNA AMP PROB: CPT | Performed by: PHYSICIAN ASSISTANT

## 2022-01-03 PROCEDURE — 87389 HIV-1 AG W/HIV-1&-2 AB AG IA: CPT | Performed by: PHYSICIAN ASSISTANT

## 2022-01-03 PROCEDURE — 1159F MED LIST DOCD IN RCRD: CPT | Mod: CPTII,S$GLB,, | Performed by: PHYSICIAN ASSISTANT

## 2022-01-03 PROCEDURE — 1160F RVW MEDS BY RX/DR IN RCRD: CPT | Mod: CPTII,S$GLB,, | Performed by: PHYSICIAN ASSISTANT

## 2022-01-03 PROCEDURE — 87801 DETECT AGNT MULT DNA AMPLI: CPT | Performed by: PHYSICIAN ASSISTANT

## 2022-01-03 PROCEDURE — 87340 HEPATITIS B SURFACE AG IA: CPT | Performed by: PHYSICIAN ASSISTANT

## 2022-01-03 PROCEDURE — 3074F SYST BP LT 130 MM HG: CPT | Mod: CPTII,S$GLB,, | Performed by: PHYSICIAN ASSISTANT

## 2022-01-03 PROCEDURE — 3008F BODY MASS INDEX DOCD: CPT | Mod: CPTII,S$GLB,, | Performed by: PHYSICIAN ASSISTANT

## 2022-01-03 PROCEDURE — 36415 COLL VENOUS BLD VENIPUNCTURE: CPT | Performed by: PHYSICIAN ASSISTANT

## 2022-01-03 PROCEDURE — 3079F PR MOST RECENT DIASTOLIC BLOOD PRESSURE 80-89 MM HG: ICD-10-PCS | Mod: CPTII,S$GLB,, | Performed by: PHYSICIAN ASSISTANT

## 2022-01-03 PROCEDURE — 99999 PR PBB SHADOW E&M-EST. PATIENT-LVL III: ICD-10-PCS | Mod: PBBFAC,,, | Performed by: PHYSICIAN ASSISTANT

## 2022-01-03 PROCEDURE — 87491 CHLMYD TRACH DNA AMP PROBE: CPT | Mod: 59 | Performed by: PHYSICIAN ASSISTANT

## 2022-01-03 PROCEDURE — 87481 CANDIDA DNA AMP PROBE: CPT | Mod: 59 | Performed by: PHYSICIAN ASSISTANT

## 2022-01-03 PROCEDURE — 3008F PR BODY MASS INDEX (BMI) DOCUMENTED: ICD-10-PCS | Mod: CPTII,S$GLB,, | Performed by: PHYSICIAN ASSISTANT

## 2022-01-03 PROCEDURE — 3074F PR MOST RECENT SYSTOLIC BLOOD PRESSURE < 130 MM HG: ICD-10-PCS | Mod: CPTII,S$GLB,, | Performed by: PHYSICIAN ASSISTANT

## 2022-01-03 PROCEDURE — 99395 PR PREVENTIVE VISIT,EST,18-39: ICD-10-PCS | Mod: S$GLB,,, | Performed by: PHYSICIAN ASSISTANT

## 2022-01-03 PROCEDURE — 86592 SYPHILIS TEST NON-TREP QUAL: CPT | Performed by: PHYSICIAN ASSISTANT

## 2022-01-03 NOTE — PROGRESS NOTES
Subjective:       Patient ID: Jennifer Sanches is a 31 y.o. female.    Chief Complaint   Patient presents with    Well Woman       History of Present Illness:    Jennifer Sanches is a  31 y.o. woman who presents for her annual exam.     Annual Exam-Premenopausal  Patient presents for annual exam. The patient has no complaints today. The patient is sexually active. GYN screening history: last pap: approximate date  and was normal. The patient wears seatbelts: yes. The patient reports that there is not domestic violence in her life.    Contraception Counseling  Patient presents for contraception counseling. The patient has no complaints today. The patient is sexually active. Pertinent past medical history: none.    Vaginal Discharge and Irritation  Patient presents for vaginal discharge check. Sexual history reviewed with the patient. STD exposure: denies knowledge of risky exposure.  Previous history of STD:  none. Current symptoms include none.  Contraception: none.    Covid vaccine status: vaccinated  Flu vaccine status: not vaccinated  Gardasil vaccine status: getting today      Menstrual History: reports periods are regular; mild cramping with periods; heavy menstrual bleeding but denies needing to use 1+ pad/tampon per hour  Obstetric Hx:   STD/STI Hx: none  Contraception Hx: mirena      GYN & OB History  Patient's last menstrual period was 2021 (exact date).   Date of last PAP:  - normal    OB History    Para Term  AB Living   1       1     SAB IAB Ectopic Multiple Live Births     1            # Outcome Date GA Lbr Fernando/2nd Weight Sex Delivery Anes PTL Lv   1 IAB                 Past Medical History:   Diagnosis Date    Abnormal Pap smear     colpo    Abnormal Pap smear of cervix         Past Surgical History:   Procedure Laterality Date    WISDOM TOOTH EXTRACTION          Social History     Socioeconomic History    Marital status: Single   Tobacco Use    Smoking  status: Never Smoker    Smokeless tobacco: Never Used   Substance and Sexual Activity    Alcohol use: No     Comment: 2 x month    Drug use: No    Sexual activity: Yes     Partners: Male     Birth control/protection: None        Family History   Problem Relation Age of Onset    Hypertension Mother     Hypertension Father     No Known Problems Sister     Alzheimer's disease Paternal Grandmother     Breast cancer Neg Hx     Colon cancer Neg Hx     Ovarian cancer Neg Hx           ROS:  GENERAL: Feeling well overall. Denies fever or chills.   SKIN: Denies rash or lesions.   HEAD: Denies head injury or headache.   NODES: Denies enlarged lymph nodes.   CHEST: Denies chest pain or shortness of breath.   CARDIOVASCULAR: Denies palpitations or left sided chest pain.   ABDOMEN: No abdominal pain, constipation, diarrhea, nausea, vomiting or rectal bleeding.   URINARY: No dysuria, hematuria, or burning on urination.  REPRODUCTIVE: See HPI.   BREASTS: Denies pain, lumps, or nipple discharge.   HEMATOLOGIC: No easy bruisability or excessive bleeding.   MUSCULOSKELETAL: Denies joint pain or swelling.   NEUROLOGIC: Denies syncope or weakness.   PSYCHIATRIC: Denies depression, anxiety or mood swings.      Objective:     PE:   APPEARANCE: Well nourished, well developed female in no acute distress.  NODES: no cervical, supraclavicular, or inguinal lymphadenopathy  BREASTS: Symmetrical, no skin changes or visible lesions. No palpable masses, nipple discharge or adenopathy bilaterally.  ABDOMEN: Soft. No tenderness or masses. No distention. No hernias palpated. No CVA tenderness.  VULVA: No lesions. Normal external female genitalia.  URETHRAL MEATUS: Normal size and location, no lesions, no prolapse.  URETHRA: No masses, tenderness, or prolapse.  VAGINA: Moist. No lesions or lacerations noted. No abnormal discharge present. No odor present.   CERVIX: No lesions or discharge. No cervical motion tenderness.   UTERUS: Normal  size, regular shape, mobile, non-tender.  ADNEXA: No tenderness. No fullness or masses palpated in the adnexal regions.   ANUS PERINEUM: Normal.  SKIN: No rashes, lesions, ulcers, acne, hirsutism.  RESP: Normal respiratory effort.  MENTAL STATUS: Alert, oriented x 3, normal affect and mood.    Assessment:     1. Well woman exam    2. Birth control counseling    3. Screen for STD (sexually transmitted disease)         No visits with results within 1 Day(s) from this visit.   Latest known visit with results is:   Office Visit on 09/10/2021   Component Date Value Ref Range Status    POC Rapid COVID 09/10/2021 Negative  Negative Final     Acceptable 09/10/2021 Yes   Final       Plan:     Well woman exam    Birth control counseling  -     POCT Urine Pregnancy    Screen for STD (sexually transmitted disease)         Well Woman:    - Pap smear: UTD  - Birth control: mirena - device authorization order placed  - GC/CT: Collected  - Affirm: collected  - RPR/HIV/Hep B: ordered and scheduled in lab  - Mammogram: N/A  - Smoking cessation: n/a  - Vaccines: covid vaccinated, HPV vaccine today; declines flu vaccine      Patient was counseled today on the new ACS guidelines for cervical cytology screening as well as the current recommendations for breast cancer screening. She was counseled to follow up with her PCP for other routine health maintenance.     F/u in 1 year or sooner PRN.    Marivel Shields PA-C

## 2022-01-03 NOTE — TELEPHONE ENCOUNTER
----- Message from Billy Weldon sent at 1/3/2022  3:41 PM CST -----  PT WAS SEEN TODAY , NEEDS AN APPT SCHEDULED FOR IUD INSERT BETWEEN 1/17 AND 1/19 SHE WILL BE ON HER CYCLE.    IM UNABLE TO FIND ANYTHING      PT CALL BACK [phone]

## 2022-01-04 LAB
HBV SURFACE AG SERPL QL IA: NEGATIVE
HIV 1+2 AB+HIV1 P24 AG SERPL QL IA: NEGATIVE
RPR SER QL: NORMAL

## 2022-01-05 ENCOUNTER — IMMUNIZATION (OUTPATIENT)
Dept: PHARMACY | Facility: CLINIC | Age: 32
End: 2022-01-05
Payer: COMMERCIAL

## 2022-01-08 ENCOUNTER — PATIENT MESSAGE (OUTPATIENT)
Dept: OBSTETRICS AND GYNECOLOGY | Facility: CLINIC | Age: 32
End: 2022-01-08
Payer: COMMERCIAL

## 2022-01-11 ENCOUNTER — TELEPHONE (OUTPATIENT)
Dept: OBSTETRICS AND GYNECOLOGY | Facility: CLINIC | Age: 32
End: 2022-01-11
Payer: COMMERCIAL

## 2022-01-11 DIAGNOSIS — B96.89 BACTERIAL VAGINOSIS: Primary | ICD-10-CM

## 2022-01-11 DIAGNOSIS — A59.9 TRICHIMONIASIS: ICD-10-CM

## 2022-01-11 DIAGNOSIS — N76.0 BACTERIAL VAGINOSIS: Primary | ICD-10-CM

## 2022-01-11 RX ORDER — METRONIDAZOLE 500 MG/1
500 TABLET ORAL EVERY 12 HOURS
Qty: 14 TABLET | Refills: 0 | Status: SHIPPED | OUTPATIENT
Start: 2022-01-11 | End: 2022-01-18

## 2022-01-25 ENCOUNTER — IMMUNIZATION (OUTPATIENT)
Dept: PHARMACY | Facility: CLINIC | Age: 32
End: 2022-01-25
Payer: COMMERCIAL

## 2022-01-25 DIAGNOSIS — Z23 NEED FOR VACCINATION: Primary | ICD-10-CM

## 2022-02-21 ENCOUNTER — PATIENT MESSAGE (OUTPATIENT)
Dept: OBSTETRICS AND GYNECOLOGY | Facility: CLINIC | Age: 32
End: 2022-02-21
Payer: COMMERCIAL

## 2022-02-24 ENCOUNTER — OFFICE VISIT (OUTPATIENT)
Dept: OBSTETRICS AND GYNECOLOGY | Facility: CLINIC | Age: 32
End: 2022-02-24
Payer: COMMERCIAL

## 2022-02-24 VITALS
SYSTOLIC BLOOD PRESSURE: 138 MMHG | HEIGHT: 64 IN | BODY MASS INDEX: 49.57 KG/M2 | DIASTOLIC BLOOD PRESSURE: 88 MMHG | WEIGHT: 290.38 LBS

## 2022-02-24 DIAGNOSIS — Z86.19 HISTORY OF TRICHOMONIASIS: Primary | ICD-10-CM

## 2022-02-24 PROCEDURE — 3079F DIAST BP 80-89 MM HG: CPT | Mod: CPTII,S$GLB,, | Performed by: PHYSICIAN ASSISTANT

## 2022-02-24 PROCEDURE — 3075F PR MOST RECENT SYSTOLIC BLOOD PRESS GE 130-139MM HG: ICD-10-PCS | Mod: CPTII,S$GLB,, | Performed by: PHYSICIAN ASSISTANT

## 2022-02-24 PROCEDURE — 99999 PR PBB SHADOW E&M-EST. PATIENT-LVL III: ICD-10-PCS | Mod: PBBFAC,,, | Performed by: PHYSICIAN ASSISTANT

## 2022-02-24 PROCEDURE — 99999 PR PBB SHADOW E&M-EST. PATIENT-LVL III: CPT | Mod: PBBFAC,,, | Performed by: PHYSICIAN ASSISTANT

## 2022-02-24 PROCEDURE — 3008F PR BODY MASS INDEX (BMI) DOCUMENTED: ICD-10-PCS | Mod: CPTII,S$GLB,, | Performed by: PHYSICIAN ASSISTANT

## 2022-02-24 PROCEDURE — 3008F BODY MASS INDEX DOCD: CPT | Mod: CPTII,S$GLB,, | Performed by: PHYSICIAN ASSISTANT

## 2022-02-24 PROCEDURE — 99213 PR OFFICE/OUTPT VISIT, EST, LEVL III, 20-29 MIN: ICD-10-PCS | Mod: S$GLB,,, | Performed by: PHYSICIAN ASSISTANT

## 2022-02-24 PROCEDURE — 87801 DETECT AGNT MULT DNA AMPLI: CPT | Performed by: PHYSICIAN ASSISTANT

## 2022-02-24 PROCEDURE — 87481 CANDIDA DNA AMP PROBE: CPT | Mod: 59 | Performed by: PHYSICIAN ASSISTANT

## 2022-02-24 PROCEDURE — 3079F PR MOST RECENT DIASTOLIC BLOOD PRESSURE 80-89 MM HG: ICD-10-PCS | Mod: CPTII,S$GLB,, | Performed by: PHYSICIAN ASSISTANT

## 2022-02-24 PROCEDURE — 3075F SYST BP GE 130 - 139MM HG: CPT | Mod: CPTII,S$GLB,, | Performed by: PHYSICIAN ASSISTANT

## 2022-02-24 PROCEDURE — 99213 OFFICE O/P EST LOW 20 MIN: CPT | Mod: S$GLB,,, | Performed by: PHYSICIAN ASSISTANT

## 2022-02-24 NOTE — LETTER
February 24, 2022    Jennifer Sanches  [address]  [address]  [place] [address]         [facility]  [address] [address]  Phone: [phone]  Fax: [phone] February 24, 2022     Patient: Jennifer Sanches   YOB: 1990   Date of Visit: 2/24/2022       To Whom It May Concern:    It is my medical opinion that Jennifer Sanches may return to work/school with nor restrictions. If you have any questions or concerns, please don't hesitate to call.    Sincerely,        Marivel Shields PA-C/Gisela Correa LPN     
12

## 2022-02-24 NOTE — PROGRESS NOTES
Subjective:       Patient ID: Jennifer Sanches is a 31 y.o. female.    Chief Complaint:  Follow-up      History of Present Illness  Pt tested positive for trich on 1/3. She was treated appropriately and returns for LIZ.  Denies symptoms.      GYN & OB History  Patient's last menstrual period was 2022.   Date of Last Pap: 2020    OB History    Para Term  AB Living   1       1     SAB IAB Ectopic Multiple Live Births     1            # Outcome Date GA Lbr Fernando/2nd Weight Sex Delivery Anes PTL Lv   1 IAB                Review of Systems  Review of Systems   Constitutional: Negative for fever.   Cardiovascular: Negative for chest pain.   Gastrointestinal: Negative for abdominal pain, diarrhea, nausea and vomiting.   Genitourinary: Negative for bladder incontinence, dysmenorrhea, dysuria, flank pain, frequency, genital sores, hematuria, hot flashes, menorrhagia, pelvic pain, urgency, vaginal bleeding, vaginal discharge, vaginal pain, urinary incontinence, vaginal dryness and vaginal odor.   Integumentary:  Negative for rash.   Neurological: Negative for headaches.           Objective:    Physical Exam:   Constitutional: She appears well-developed and well-nourished. She is cooperative. No distress.    HENT:   Head: Normocephalic and atraumatic.    Eyes: Conjunctivae and EOM are normal.    Neck: No thyromegaly present.     Pulmonary/Chest: Effort normal. No respiratory distress.        Abdominal: Soft. She exhibits no distension. There is no abdominal tenderness. There is no guarding. Hernia confirmed negative in the right inguinal area and confirmed negative in the left inguinal area.     Genitourinary:    Inguinal canal, vagina, uterus, right adnexa and left adnexa normal.      Pelvic exam was performed with patient supine.   There is no rash or tenderness on the right labia. There is no rash or tenderness on the left labia. Cervix is normal. Right adnexum displays no tenderness. Left adnexum  displays no tenderness. No erythema,  no vaginal discharge, tenderness or bleeding in the vagina.    No foreign body in the vagina.   Cervix exhibits no motion tenderness and no discharge. Uterus is not enlarged and not tender.               Neurological: She is alert.    Skin: Skin is warm and dry. No rash noted.    Psychiatric: Her speech is normal.          Assessment:        1. History of trichomoniasis                Plan:      History of trichomoniasis  -     Vaginosis Screen by DNA Probe      Awaiting results.  Marivel Shields PA-C

## 2022-03-02 ENCOUNTER — PATIENT MESSAGE (OUTPATIENT)
Dept: OBSTETRICS AND GYNECOLOGY | Facility: CLINIC | Age: 32
End: 2022-03-02
Payer: COMMERCIAL

## 2022-03-03 ENCOUNTER — PATIENT MESSAGE (OUTPATIENT)
Dept: OBSTETRICS AND GYNECOLOGY | Facility: CLINIC | Age: 32
End: 2022-03-03
Payer: COMMERCIAL

## 2022-03-03 NOTE — TELEPHONE ENCOUNTER
If the first day of her period was on 2/27, then tomorrow will be cycle day 6. This is fine- it needs to be on CD 1-7. Is she currently on birth control? Does she need cytotec?

## 2022-03-04 ENCOUNTER — PATIENT MESSAGE (OUTPATIENT)
Dept: PHARMACY | Facility: CLINIC | Age: 32
End: 2022-03-04
Payer: COMMERCIAL

## 2022-03-04 ENCOUNTER — PATIENT MESSAGE (OUTPATIENT)
Dept: OBSTETRICS AND GYNECOLOGY | Facility: CLINIC | Age: 32
End: 2022-03-04

## 2022-03-04 ENCOUNTER — PROCEDURE VISIT (OUTPATIENT)
Dept: OBSTETRICS AND GYNECOLOGY | Facility: CLINIC | Age: 32
End: 2022-03-04
Payer: COMMERCIAL

## 2022-03-04 ENCOUNTER — HOSPITAL ENCOUNTER (OUTPATIENT)
Dept: RADIOLOGY | Facility: OTHER | Age: 32
Discharge: HOME | End: 2022-03-04
Attending: NURSE PRACTITIONER
Payer: COMMERCIAL

## 2022-03-04 VITALS
HEIGHT: 64 IN | BODY MASS INDEX: 49.57 KG/M2 | WEIGHT: 290.38 LBS | DIASTOLIC BLOOD PRESSURE: 74 MMHG | SYSTOLIC BLOOD PRESSURE: 95 MMHG

## 2022-03-04 DIAGNOSIS — N76.0 BV (BACTERIAL VAGINOSIS): ICD-10-CM

## 2022-03-04 DIAGNOSIS — A59.9 TRICHOMONAS VAGINALIS INFECTION: ICD-10-CM

## 2022-03-04 DIAGNOSIS — B96.89 BV (BACTERIAL VAGINOSIS): ICD-10-CM

## 2022-03-04 DIAGNOSIS — Z30.430 ENCOUNTER FOR IUD INSERTION: Primary | ICD-10-CM

## 2022-03-04 DIAGNOSIS — Z30.430 ENCOUNTER FOR IUD INSERTION: ICD-10-CM

## 2022-03-04 DIAGNOSIS — N94.6 DYSMENORRHEA: ICD-10-CM

## 2022-03-04 LAB
B-HCG UR QL: NEGATIVE
CTP QC/QA: YES

## 2022-03-04 PROCEDURE — 76830 TRANSVAGINAL US NON-OB: CPT | Mod: 26,,, | Performed by: RADIOLOGY

## 2022-03-04 PROCEDURE — 76830 TRANSVAGINAL US NON-OB: CPT | Mod: TC

## 2022-03-04 PROCEDURE — 58300 INSERT INTRAUTERINE DEVICE: CPT | Mod: S$GLB,,, | Performed by: NURSE PRACTITIONER

## 2022-03-04 PROCEDURE — 81025 URINE PREGNANCY TEST: CPT | Mod: S$GLB,,, | Performed by: NURSE PRACTITIONER

## 2022-03-04 PROCEDURE — 76856 US EXAM PELVIC COMPLETE: CPT | Mod: 26,,, | Performed by: RADIOLOGY

## 2022-03-04 PROCEDURE — 87591 N.GONORRHOEAE DNA AMP PROB: CPT | Performed by: NURSE PRACTITIONER

## 2022-03-04 PROCEDURE — 76856 US PELVIS COMP WITH TRANSVAG NON-OB (XPD): ICD-10-PCS | Mod: 26,,, | Performed by: RADIOLOGY

## 2022-03-04 PROCEDURE — 76830 US PELVIS COMP WITH TRANSVAG NON-OB (XPD): ICD-10-PCS | Mod: 26,,, | Performed by: RADIOLOGY

## 2022-03-04 PROCEDURE — 87491 CHLMYD TRACH DNA AMP PROBE: CPT | Performed by: NURSE PRACTITIONER

## 2022-03-04 PROCEDURE — 81025 POCT URINE PREGNANCY: ICD-10-PCS | Mod: S$GLB,,, | Performed by: NURSE PRACTITIONER

## 2022-03-04 PROCEDURE — 58300 INSERTION OF IUD: ICD-10-PCS | Mod: S$GLB,,, | Performed by: NURSE PRACTITIONER

## 2022-03-04 PROCEDURE — 99213 OFFICE O/P EST LOW 20 MIN: CPT | Mod: 25,S$GLB,, | Performed by: NURSE PRACTITIONER

## 2022-03-04 PROCEDURE — 99213 PR OFFICE/OUTPT VISIT, EST, LEVL III, 20-29 MIN: ICD-10-PCS | Mod: 25,S$GLB,, | Performed by: NURSE PRACTITIONER

## 2022-03-04 RX ORDER — FLUCONAZOLE 200 MG/1
200 TABLET ORAL ONCE
Qty: 1 TABLET | Refills: 0 | Status: SHIPPED | OUTPATIENT
Start: 2022-03-04 | End: 2022-03-04

## 2022-03-04 RX ORDER — METRONIDAZOLE 500 MG/1
2000 TABLET ORAL ONCE
Qty: 4 TABLET | Refills: 1 | Status: SHIPPED | OUTPATIENT
Start: 2022-03-04 | End: 2022-03-04

## 2022-03-04 RX ORDER — METRONIDAZOLE 500 MG/1
500 TABLET ORAL EVERY 12 HOURS
Qty: 14 TABLET | Refills: 0 | Status: SHIPPED | OUTPATIENT
Start: 2022-03-04 | End: 2022-03-04

## 2022-03-04 RX ORDER — IBUPROFEN 800 MG/1
800 TABLET ORAL EVERY 8 HOURS PRN
Qty: 60 TABLET | Refills: 0 | Status: SHIPPED | OUTPATIENT
Start: 2022-03-04 | End: 2023-03-04

## 2022-03-04 RX ORDER — METRONIDAZOLE 7.5 MG/G
1 GEL VAGINAL NIGHTLY
Qty: 1 G | Refills: 0 | Status: SHIPPED | OUTPATIENT
Start: 2022-03-04 | End: 2022-03-11

## 2022-03-04 NOTE — PROCEDURES
Insertion of IUD    Date/Time: 3/4/2022 9:00 AM  Performed by: Thao Camacho NP  Authorized by: Thao Camacho NP     Consent:     Consent obtained:  Written    Consent given by:  Patient    Procedure risks and benefits discussed: yes      Patient questions answered: yes      Patient agrees, verbalizes understanding, and wants to proceed: yes      Instructions and paperwork completed: yes    Procedure:     Pelvic exam performed: yes      Negative GC/chlamydia test: done today.      Negative urine pregnancy test: yes      Cervix cleaned and prepped: yes      Speculum placed in vagina: yes      Tenaculum applied to cervix: yes      Uterus sounded: yes      Uterus sound depth (cm):  7    IUD inserted with no complications: yes      IUD type:  Mirena    Strings trimmed: yes    1 Intra Uterine Device levonorgestreL 20 mcg/24 hours (7 yrs) 52 mg       Post-procedure:     Patient tolerated procedure well: yes      Patient will follow up after next period: yes    Comments:      GC pending  Pt vomited her last treatment for trichomonas- did flagyl x 7 days. LIZ was positive- will retreat today with Flagyl 2 g x 1 dose. Metrogel for the BV  Met some resistance with IUD insertion- will get pelvic US to confirm placement.

## 2022-03-06 LAB
C TRACH DNA SPEC QL NAA+PROBE: NOT DETECTED
N GONORRHOEA DNA SPEC QL NAA+PROBE: NOT DETECTED

## 2022-04-04 ENCOUNTER — PATIENT MESSAGE (OUTPATIENT)
Dept: OBSTETRICS AND GYNECOLOGY | Facility: CLINIC | Age: 32
End: 2022-04-04
Payer: COMMERCIAL

## 2022-04-14 ENCOUNTER — OFFICE VISIT (OUTPATIENT)
Dept: OBSTETRICS AND GYNECOLOGY | Facility: CLINIC | Age: 32
End: 2022-04-14
Payer: COMMERCIAL

## 2022-04-14 VITALS
BODY MASS INDEX: 50.02 KG/M2 | DIASTOLIC BLOOD PRESSURE: 82 MMHG | SYSTOLIC BLOOD PRESSURE: 122 MMHG | HEIGHT: 64 IN | WEIGHT: 293 LBS

## 2022-04-14 DIAGNOSIS — Z11.3 SCREEN FOR STD (SEXUALLY TRANSMITTED DISEASE): ICD-10-CM

## 2022-04-14 DIAGNOSIS — Z30.431 IUD CHECK UP: Primary | ICD-10-CM

## 2022-04-14 PROBLEM — Z97.5 IUD (INTRAUTERINE DEVICE) IN PLACE: Status: ACTIVE | Noted: 2022-04-14

## 2022-04-14 PROCEDURE — 3008F PR BODY MASS INDEX (BMI) DOCUMENTED: ICD-10-PCS | Mod: CPTII,S$GLB,, | Performed by: NURSE PRACTITIONER

## 2022-04-14 PROCEDURE — 1160F PR REVIEW ALL MEDS BY PRESCRIBER/CLIN PHARMACIST DOCUMENTED: ICD-10-PCS | Mod: CPTII,S$GLB,, | Performed by: NURSE PRACTITIONER

## 2022-04-14 PROCEDURE — 3008F BODY MASS INDEX DOCD: CPT | Mod: CPTII,S$GLB,, | Performed by: NURSE PRACTITIONER

## 2022-04-14 PROCEDURE — 3079F DIAST BP 80-89 MM HG: CPT | Mod: CPTII,S$GLB,, | Performed by: NURSE PRACTITIONER

## 2022-04-14 PROCEDURE — 3079F PR MOST RECENT DIASTOLIC BLOOD PRESSURE 80-89 MM HG: ICD-10-PCS | Mod: CPTII,S$GLB,, | Performed by: NURSE PRACTITIONER

## 2022-04-14 PROCEDURE — 99212 OFFICE O/P EST SF 10 MIN: CPT | Mod: S$GLB,,, | Performed by: NURSE PRACTITIONER

## 2022-04-14 PROCEDURE — 87481 CANDIDA DNA AMP PROBE: CPT | Mod: 59 | Performed by: NURSE PRACTITIONER

## 2022-04-14 PROCEDURE — 1159F MED LIST DOCD IN RCRD: CPT | Mod: CPTII,S$GLB,, | Performed by: NURSE PRACTITIONER

## 2022-04-14 PROCEDURE — 3074F PR MOST RECENT SYSTOLIC BLOOD PRESSURE < 130 MM HG: ICD-10-PCS | Mod: CPTII,S$GLB,, | Performed by: NURSE PRACTITIONER

## 2022-04-14 PROCEDURE — 1159F PR MEDICATION LIST DOCUMENTED IN MEDICAL RECORD: ICD-10-PCS | Mod: CPTII,S$GLB,, | Performed by: NURSE PRACTITIONER

## 2022-04-14 PROCEDURE — 1160F RVW MEDS BY RX/DR IN RCRD: CPT | Mod: CPTII,S$GLB,, | Performed by: NURSE PRACTITIONER

## 2022-04-14 PROCEDURE — 99212 PR OFFICE/OUTPT VISIT, EST, LEVL II, 10-19 MIN: ICD-10-PCS | Mod: S$GLB,,, | Performed by: NURSE PRACTITIONER

## 2022-04-14 PROCEDURE — 99999 PR PBB SHADOW E&M-EST. PATIENT-LVL III: CPT | Mod: PBBFAC,,, | Performed by: NURSE PRACTITIONER

## 2022-04-14 PROCEDURE — 3074F SYST BP LT 130 MM HG: CPT | Mod: CPTII,S$GLB,, | Performed by: NURSE PRACTITIONER

## 2022-04-14 PROCEDURE — 99999 PR PBB SHADOW E&M-EST. PATIENT-LVL III: ICD-10-PCS | Mod: PBBFAC,,, | Performed by: NURSE PRACTITIONER

## 2022-04-14 NOTE — PROGRESS NOTES
CC: IUD check    Pt is a 30 y/o female  presents for IUD check. Patient had a MIRENA placed on 3/4/22. She is not having problems with bleeding, cramping, fever or discharge. She has not tried to feel the strings. She was retreated for trich at the last visit with me- [facility]elardo today.    ROS:  GENERAL: Feeling well overall. Denies fever or chills.   SKIN: Denies rash or lesions.   HEAD: Denies head injury or headache.   NODES: Denies enlarged lymph nodes.   CHEST: Denies chest pain or shortness of breath.   CARDIOVASCULAR: Denies palpitations or left sided chest pain.   ABDOMEN: No abdominal pain, constipation, diarrhea, nausea, vomiting or rectal bleeding.   URINARY: No dysuria, hematuria, or burning on urination.  REPRODUCTIVE: See HPI.   BREASTS: Denies pain, lumps, or nipple discharge.   HEMATOLOGIC: No easy bruisability or excessive bleeding.   MUSCULOSKELETAL: Denies joint pain or swelling.   NEUROLOGIC: Denies syncope or weakness.   PSYCHIATRIC: Denies depression, anxiety or mood swings.      PHYSICAL EXAM:  Abdomen: Soft, non-tender, non-distended  Vulva: No lesions  Vaginal: No lesions, no abnormal discharge  Cervix: No discharge, no CMT, IUD strings visible at os (2 cm out)  Uterus: Normal size, non-tender  Adnexa: No masses, non-tender    ASSESSMENT AND PLAN:  1. IUD surveillance    1. Patient counseled on IUD danger signs and how to check the strings reviewed.  2. GC negative, repeat trich today    Return for annual GYN exam

## 2022-04-19 ENCOUNTER — PATIENT MESSAGE (OUTPATIENT)
Dept: OBSTETRICS AND GYNECOLOGY | Facility: CLINIC | Age: 32
End: 2022-04-19
Payer: COMMERCIAL

## 2022-04-20 ENCOUNTER — TELEPHONE (OUTPATIENT)
Dept: OBSTETRICS AND GYNECOLOGY | Facility: CLINIC | Age: 32
End: 2022-04-20
Payer: COMMERCIAL

## 2022-04-20 DIAGNOSIS — A59.9 TRICHIMONIASIS: ICD-10-CM

## 2022-04-20 DIAGNOSIS — A59.9 TRICHIMONIASIS: Primary | ICD-10-CM

## 2022-04-20 RX ORDER — TINIDAZOLE 500 MG/1
2 TABLET ORAL ONCE
Qty: 4 TABLET | Refills: 0 | Status: SHIPPED | OUTPATIENT
Start: 2022-04-20 | End: 2022-04-20

## 2022-04-20 RX ORDER — TINIDAZOLE 500 MG/1
2 TABLET ORAL DAILY
Qty: 28 TABLET | Refills: 0 | Status: SHIPPED | OUTPATIENT
Start: 2022-04-20 | End: 2022-04-27

## 2022-04-20 NOTE — TELEPHONE ENCOUNTER
----- Message from Thao Camacho NP sent at 4/20/2022  1:56 PM CDT -----  Please call pt- I sent in a Rx today for Tindamax. Originally only sent it in for one dose, but she actually needs to take it for 7 days. She keeps testing positive for trich and failing the other treatments. This is the next step.

## 2023-02-15 ENCOUNTER — OFFICE VISIT (OUTPATIENT)
Dept: URGENT CARE | Facility: CLINIC | Age: 33
End: 2023-02-15
Payer: COMMERCIAL

## 2023-02-15 VITALS
BODY MASS INDEX: 50.02 KG/M2 | WEIGHT: 293 LBS | TEMPERATURE: 98 F | HEART RATE: 86 BPM | RESPIRATION RATE: 18 BRPM | OXYGEN SATURATION: 98 % | SYSTOLIC BLOOD PRESSURE: 151 MMHG | DIASTOLIC BLOOD PRESSURE: 93 MMHG | HEIGHT: 64 IN

## 2023-02-15 DIAGNOSIS — J35.8 TONSIL STONE: ICD-10-CM

## 2023-02-15 DIAGNOSIS — J02.8 PHARYNGITIS DUE TO OTHER ORGANISM: Primary | ICD-10-CM

## 2023-02-15 LAB
CTP QC/QA: YES
MOLECULAR STREP A: NEGATIVE

## 2023-02-15 PROCEDURE — 99213 PR OFFICE/OUTPT VISIT, EST, LEVL III, 20-29 MIN: ICD-10-PCS | Mod: 25,S$GLB,, | Performed by: NURSE PRACTITIONER

## 2023-02-15 PROCEDURE — 3080F PR MOST RECENT DIASTOLIC BLOOD PRESSURE >= 90 MM HG: ICD-10-PCS | Mod: CPTII,S$GLB,, | Performed by: NURSE PRACTITIONER

## 2023-02-15 PROCEDURE — 87651 STREP A DNA AMP PROBE: CPT | Mod: QW,S$GLB,, | Performed by: NURSE PRACTITIONER

## 2023-02-15 PROCEDURE — 1160F RVW MEDS BY RX/DR IN RCRD: CPT | Mod: CPTII,S$GLB,, | Performed by: NURSE PRACTITIONER

## 2023-02-15 PROCEDURE — 1159F PR MEDICATION LIST DOCUMENTED IN MEDICAL RECORD: ICD-10-PCS | Mod: CPTII,S$GLB,, | Performed by: NURSE PRACTITIONER

## 2023-02-15 PROCEDURE — 1160F PR REVIEW ALL MEDS BY PRESCRIBER/CLIN PHARMACIST DOCUMENTED: ICD-10-PCS | Mod: CPTII,S$GLB,, | Performed by: NURSE PRACTITIONER

## 2023-02-15 PROCEDURE — 99213 OFFICE O/P EST LOW 20 MIN: CPT | Mod: 25,S$GLB,, | Performed by: NURSE PRACTITIONER

## 2023-02-15 PROCEDURE — 87651 POCT STREP A MOLECULAR: ICD-10-PCS | Mod: QW,S$GLB,, | Performed by: NURSE PRACTITIONER

## 2023-02-15 PROCEDURE — 3080F DIAST BP >= 90 MM HG: CPT | Mod: CPTII,S$GLB,, | Performed by: NURSE PRACTITIONER

## 2023-02-15 PROCEDURE — 1159F MED LIST DOCD IN RCRD: CPT | Mod: CPTII,S$GLB,, | Performed by: NURSE PRACTITIONER

## 2023-02-15 PROCEDURE — 3008F PR BODY MASS INDEX (BMI) DOCUMENTED: ICD-10-PCS | Mod: CPTII,S$GLB,, | Performed by: NURSE PRACTITIONER

## 2023-02-15 PROCEDURE — 3077F PR MOST RECENT SYSTOLIC BLOOD PRESSURE >= 140 MM HG: ICD-10-PCS | Mod: CPTII,S$GLB,, | Performed by: NURSE PRACTITIONER

## 2023-02-15 PROCEDURE — 3077F SYST BP >= 140 MM HG: CPT | Mod: CPTII,S$GLB,, | Performed by: NURSE PRACTITIONER

## 2023-02-15 PROCEDURE — 96372 PR INJECTION,THERAP/PROPH/DIAG2ST, IM OR SUBCUT: ICD-10-PCS | Mod: S$GLB,,, | Performed by: NURSE PRACTITIONER

## 2023-02-15 PROCEDURE — 96372 THER/PROPH/DIAG INJ SC/IM: CPT | Mod: S$GLB,,, | Performed by: NURSE PRACTITIONER

## 2023-02-15 PROCEDURE — 3008F BODY MASS INDEX DOCD: CPT | Mod: CPTII,S$GLB,, | Performed by: NURSE PRACTITIONER

## 2023-02-15 RX ORDER — AMOXICILLIN AND CLAVULANATE POTASSIUM 875; 125 MG/1; MG/1
1 TABLET, FILM COATED ORAL EVERY 12 HOURS
Qty: 20 TABLET | Refills: 0 | Status: SHIPPED | OUTPATIENT
Start: 2023-02-15 | End: 2023-02-25

## 2023-02-15 RX ORDER — DEXAMETHASONE SODIUM PHOSPHATE 100 MG/10ML
10 INJECTION INTRAMUSCULAR; INTRAVENOUS
Status: COMPLETED | OUTPATIENT
Start: 2023-02-15 | End: 2023-02-15

## 2023-02-15 RX ADMIN — DEXAMETHASONE SODIUM PHOSPHATE 10 MG: 100 INJECTION INTRAMUSCULAR; INTRAVENOUS at 03:02

## 2023-02-15 NOTE — PROGRESS NOTES
"Subjective:       Patient ID: Jennifer Sanches is a 32 y.o. female.    Vitals:  height is 5' 4" (1.626 m) and weight is 133.8 kg (295 lb). Her temporal temperature is 98 °F (36.7 °C). Her blood pressure is 151/93 (abnormal) and her pulse is 86. Her respiration is 18 and oxygen saturation is 98%.     Chief Complaint: Sore Throat    This is a 32 y.o. female who presents today with a chief complaint of sore throat x 2 weeks. Denies fever. Reports a h/o tonsillitis. Pt taking  theraflu and cough drops. Denies chest pain, sob, and wheeze. Denies n/v/d.     Sore Throat   This is a new problem. The current episode started in the past 7 days. The problem has been gradually worsening. Neither side of throat is experiencing more pain than the other. There has been no fever. The fever has been present for Less than 1 day. The pain is at a severity of 7/10. The pain is moderate. Associated symptoms include congestion, ear pain, headaches, a hoarse voice, swollen glands, trouble swallowing and vomiting. Pertinent negatives include no abdominal pain, coughing, diarrhea, drooling, plugged ear sensation, neck pain, shortness of breath or stridor. She has had no exposure to strep. She has tried nothing for the symptoms. The treatment provided mild relief.     HENT:  Positive for ear pain, congestion, sore throat and trouble swallowing. Negative for drooling.    Neck: Positive for painful lymph nodes. Negative for neck pain.   Respiratory:  Negative for cough, shortness of breath and stridor.    Gastrointestinal:  Positive for vomiting. Negative for abdominal pain and diarrhea.   Neurological:  Positive for headaches.   Hematologic/Lymphatic: Positive for swollen lymph nodes.     Objective:      Physical Exam   Constitutional: She is oriented to person, place, and time. She appears well-developed. She is cooperative.  Non-toxic appearance. She does not appear ill. No distress.   HENT:   Head: Normocephalic.   Ears:   Right Ear: " Hearing, tympanic membrane, external ear and ear canal normal.   Left Ear: Hearing, tympanic membrane, external ear and ear canal normal.   Nose: Congestion present. No mucosal edema, rhinorrhea or nasal deformity. No epistaxis. Right sinus exhibits no maxillary sinus tenderness and no frontal sinus tenderness. Left sinus exhibits no maxillary sinus tenderness and no frontal sinus tenderness.   Mouth/Throat: Uvula is midline and mucous membranes are normal. Mucous membranes are moist. No trismus in the jaw. Normal dentition. No uvula swelling. Oropharyngeal exudate and posterior oropharyngeal erythema present. No posterior oropharyngeal edema.   Eyes: Lids are normal. No scleral icterus.   Neck: Trachea normal and phonation normal. Neck supple. No edema present. No erythema present. No neck rigidity present.   Cardiovascular: Normal rate, regular rhythm and normal heart sounds.   Pulmonary/Chest: Effort normal and breath sounds normal. No respiratory distress. She has no decreased breath sounds. She has no rhonchi.   Abdominal: Normal appearance.   Musculoskeletal: Normal range of motion.         General: No deformity. Normal range of motion.   Neurological: She is alert and oriented to person, place, and time. She exhibits normal muscle tone. Coordination normal.   Skin: Skin is warm, dry, intact, not diaphoretic and not pale.   Psychiatric: Her speech is normal and behavior is normal. Judgment and thought content normal.   Nursing note and vitals reviewed.      Results for orders placed or performed in visit on 02/15/23   POCT Strep A, Molecular   Result Value Ref Range    Molecular Strep A, POC Negative Negative     Acceptable Yes       Assessment:       1. Pharyngitis due to other organism    2. Tonsil stone          Plan:         Pharyngitis due to other organism  -     POCT Strep A, Molecular  -     dexAMETHasone injection 10 mg    Tonsil stone    Other orders  -     amoxicillin-clavulanate  875-125mg (AUGMENTIN) 875-125 mg per tablet; Take 1 tablet by mouth every 12 (twelve) hours. for 10 days  Dispense: 20 tablet; Refill: 0      Patient Instructions   Follow up with ENT if throat pain does not improve   Oral fluids  Rest

## 2024-03-06 ENCOUNTER — LAB VISIT (OUTPATIENT)
Dept: LAB | Facility: HOSPITAL | Age: 34
End: 2024-03-06
Attending: NURSE PRACTITIONER
Payer: COMMERCIAL

## 2024-03-06 ENCOUNTER — OFFICE VISIT (OUTPATIENT)
Dept: FAMILY MEDICINE | Facility: CLINIC | Age: 34
End: 2024-03-06
Payer: COMMERCIAL

## 2024-03-06 VITALS
DIASTOLIC BLOOD PRESSURE: 94 MMHG | OXYGEN SATURATION: 99 % | HEART RATE: 91 BPM | BODY MASS INDEX: 52.64 KG/M2 | SYSTOLIC BLOOD PRESSURE: 146 MMHG | WEIGHT: 293 LBS

## 2024-03-06 DIAGNOSIS — R03.0 ELEVATED BLOOD PRESSURE READING IN OFFICE WITHOUT DIAGNOSIS OF HYPERTENSION: ICD-10-CM

## 2024-03-06 DIAGNOSIS — E66.01 MORBID OBESITY WITH BMI OF 45.0-49.9, ADULT: ICD-10-CM

## 2024-03-06 DIAGNOSIS — Z00.00 ANNUAL PHYSICAL EXAM: Primary | ICD-10-CM

## 2024-03-06 DIAGNOSIS — R73.03 PRE-DIABETES: ICD-10-CM

## 2024-03-06 DIAGNOSIS — Z00.00 ANNUAL PHYSICAL EXAM: ICD-10-CM

## 2024-03-06 LAB
ALBUMIN SERPL BCP-MCNC: 3.5 G/DL (ref 3.5–5.2)
ALP SERPL-CCNC: 61 U/L (ref 55–135)
ALT SERPL W/O P-5'-P-CCNC: 19 U/L (ref 10–44)
ANION GAP SERPL CALC-SCNC: 14 MMOL/L (ref 8–16)
AST SERPL-CCNC: 20 U/L (ref 10–40)
BASOPHILS # BLD AUTO: 0.06 K/UL (ref 0–0.2)
BASOPHILS NFR BLD: 0.6 % (ref 0–1.9)
BILIRUB SERPL-MCNC: 0.6 MG/DL (ref 0.1–1)
BUN SERPL-MCNC: 9 MG/DL (ref 6–20)
CALCIUM SERPL-MCNC: 9.6 MG/DL (ref 8.7–10.5)
CHLORIDE SERPL-SCNC: 104 MMOL/L (ref 95–110)
CHOLEST SERPL-MCNC: 206 MG/DL (ref 120–199)
CHOLEST/HDLC SERPL: 4.1 {RATIO} (ref 2–5)
CO2 SERPL-SCNC: 23 MMOL/L (ref 23–29)
CREAT SERPL-MCNC: 0.8 MG/DL (ref 0.5–1.4)
DIFFERENTIAL METHOD BLD: NORMAL
EOSINOPHIL # BLD AUTO: 0 K/UL (ref 0–0.5)
EOSINOPHIL NFR BLD: 0.4 % (ref 0–8)
ERYTHROCYTE [DISTWIDTH] IN BLOOD BY AUTOMATED COUNT: 13.1 % (ref 11.5–14.5)
EST. GFR  (NO RACE VARIABLE): >60 ML/MIN/1.73 M^2
ESTIMATED AVG GLUCOSE: 114 MG/DL (ref 68–131)
GLUCOSE SERPL-MCNC: 87 MG/DL (ref 70–110)
HBA1C MFR BLD: 5.6 % (ref 4–5.6)
HCT VFR BLD AUTO: 37.6 % (ref 37–48.5)
HDLC SERPL-MCNC: 50 MG/DL (ref 40–75)
HDLC SERPL: 24.3 % (ref 20–50)
HGB BLD-MCNC: 12.1 G/DL (ref 12–16)
IMM GRANULOCYTES # BLD AUTO: 0.02 K/UL (ref 0–0.04)
IMM GRANULOCYTES NFR BLD AUTO: 0.2 % (ref 0–0.5)
LDLC SERPL CALC-MCNC: 132 MG/DL (ref 63–159)
LYMPHOCYTES # BLD AUTO: 3 K/UL (ref 1–4.8)
LYMPHOCYTES NFR BLD: 31.1 % (ref 18–48)
MCH RBC QN AUTO: 28.5 PG (ref 27–31)
MCHC RBC AUTO-ENTMCNC: 32.2 G/DL (ref 32–36)
MCV RBC AUTO: 89 FL (ref 82–98)
MONOCYTES # BLD AUTO: 0.4 K/UL (ref 0.3–1)
MONOCYTES NFR BLD: 4.2 % (ref 4–15)
NEUTROPHILS # BLD AUTO: 6.1 K/UL (ref 1.8–7.7)
NEUTROPHILS NFR BLD: 63.5 % (ref 38–73)
NONHDLC SERPL-MCNC: 156 MG/DL
NRBC BLD-RTO: 0 /100 WBC
PLATELET # BLD AUTO: 385 K/UL (ref 150–450)
PMV BLD AUTO: 11.7 FL (ref 9.2–12.9)
POTASSIUM SERPL-SCNC: 4.3 MMOL/L (ref 3.5–5.1)
PROT SERPL-MCNC: 7.9 G/DL (ref 6–8.4)
RBC # BLD AUTO: 4.25 M/UL (ref 4–5.4)
SODIUM SERPL-SCNC: 141 MMOL/L (ref 136–145)
TRIGL SERPL-MCNC: 120 MG/DL (ref 30–150)
TSH SERPL DL<=0.005 MIU/L-ACNC: 1.21 UIU/ML (ref 0.4–4)
WBC # BLD AUTO: 9.53 K/UL (ref 3.9–12.7)

## 2024-03-06 PROCEDURE — 99395 PREV VISIT EST AGE 18-39: CPT | Mod: S$GLB,,, | Performed by: NURSE PRACTITIONER

## 2024-03-06 PROCEDURE — 3077F SYST BP >= 140 MM HG: CPT | Mod: CPTII,S$GLB,, | Performed by: NURSE PRACTITIONER

## 2024-03-06 PROCEDURE — 83036 HEMOGLOBIN GLYCOSYLATED A1C: CPT | Performed by: NURSE PRACTITIONER

## 2024-03-06 PROCEDURE — 3008F BODY MASS INDEX DOCD: CPT | Mod: CPTII,S$GLB,, | Performed by: NURSE PRACTITIONER

## 2024-03-06 PROCEDURE — 1160F RVW MEDS BY RX/DR IN RCRD: CPT | Mod: CPTII,S$GLB,, | Performed by: NURSE PRACTITIONER

## 2024-03-06 PROCEDURE — 3080F DIAST BP >= 90 MM HG: CPT | Mod: CPTII,S$GLB,, | Performed by: NURSE PRACTITIONER

## 2024-03-06 PROCEDURE — 84443 ASSAY THYROID STIM HORMONE: CPT | Performed by: NURSE PRACTITIONER

## 2024-03-06 PROCEDURE — 85025 COMPLETE CBC W/AUTO DIFF WBC: CPT | Performed by: NURSE PRACTITIONER

## 2024-03-06 PROCEDURE — 1159F MED LIST DOCD IN RCRD: CPT | Mod: CPTII,S$GLB,, | Performed by: NURSE PRACTITIONER

## 2024-03-06 PROCEDURE — 36415 COLL VENOUS BLD VENIPUNCTURE: CPT | Mod: PO | Performed by: NURSE PRACTITIONER

## 2024-03-06 PROCEDURE — 80053 COMPREHEN METABOLIC PANEL: CPT | Performed by: NURSE PRACTITIONER

## 2024-03-06 PROCEDURE — 3044F HG A1C LEVEL LT 7.0%: CPT | Mod: CPTII,S$GLB,, | Performed by: NURSE PRACTITIONER

## 2024-03-06 PROCEDURE — 80061 LIPID PANEL: CPT | Performed by: NURSE PRACTITIONER

## 2024-03-06 PROCEDURE — 99999 PR PBB SHADOW E&M-EST. PATIENT-LVL III: CPT | Mod: PBBFAC,,, | Performed by: NURSE PRACTITIONER

## 2024-03-06 NOTE — PROGRESS NOTES
Subjective:       Patient ID: Jennifer Sanches is a 33 y.o. female.    Chief Complaint: Annual  First time in [facility] primary care since 2016.  First time seeing me in the clinic  HPI  Here for annual   Vitals:    03/06/24 1517   BP: (!) 146/94   Pulse: 91     Past Medical History:   Diagnosis Date    Abnormal Pap smear 2008    colpo    Abnormal Pap smear of cervix      Review of Systems   Constitutional:  Negative for fever.   Neurological:  Negative for facial asymmetry and headaches.   All other systems reviewed and are negative.      BP Readings from Last 3 Encounters:   03/06/24 (!) 146/94   02/15/23 (!) 151/93   04/14/22 122/82    States she has never been on blood pressure medications prior  Objective:      Physical Exam  Vitals and nursing note reviewed.   Constitutional:       General: She is awake.      Appearance: Normal appearance. She is well-developed. She is morbidly obese.   HENT:      Head: Normocephalic and atraumatic.      Right Ear: Hearing, tympanic membrane, ear canal and external ear normal.      Left Ear: Hearing, tympanic membrane, ear canal and external ear normal.      Nose: Nose normal.   Eyes:      General: Lids are normal.         Right eye: No foreign body, discharge or hordeolum.         Left eye: No foreign body, discharge or hordeolum.   Cardiovascular:      Rate and Rhythm: Normal rate and regular rhythm.      Heart sounds: Normal heart sounds.   Pulmonary:      Effort: Pulmonary effort is normal.      Breath sounds: Normal breath sounds.   Abdominal:      Palpations: Abdomen is soft.   Musculoskeletal:         General: Normal range of motion.      Cervical back: Full passive range of motion without pain, normal range of motion and neck supple.   Lymphadenopathy:      Head:      Right side of head: No submental, submandibular, tonsillar, preauricular, posterior auricular or occipital adenopathy.      Left side of head: No submental, submandibular, tonsillar, preauricular,  posterior auricular or occipital adenopathy.   Skin:     General: Skin is warm and dry.      Findings: No rash.   Neurological:      General: No focal deficit present.      Mental Status: She is alert and oriented to person, place, and time.   Psychiatric:         Attention and Perception: Attention and perception normal.         Mood and Affect: Mood and affect normal.         Speech: Speech normal.         Behavior: Behavior normal. Behavior is cooperative.         Thought Content: Thought content normal.         Cognition and Memory: Cognition and memory normal.         Judgment: Judgment normal.         Assessment & Plan:       Annual physical exam  -     Lipid Panel; Future; Expected date: 03/06/2024  -     Comprehensive Metabolic Panel; Future; Expected date: 03/06/2024  -     TSH; Future; Expected date: 03/06/2024  -     CBC W/ AUTO DIFFERENTIAL; Future; Expected date: 03/06/2024    Pre-diabetes  -     Hemoglobin A1C; Future; Expected date: 03/06/2024  -     Lipid Panel; Future; Expected date: 03/06/2024    Elevated blood pressure reading in office without diagnosis of hypertension    Morbid obesity with BMI of 45.0-49.9, adult  -     Hemoglobin A1C; Future; Expected date: 03/06/2024    Encouraged to take blood pressure readings daily at home and document findings and can send over portal weekly  Discussed low saturated fat items  Walk daily for 20-30 minutes  Medication List with Changes/Refills   Discontinued Medications    AZELASTINE (ASTELIN) 137 MCG (0.1 %) NASAL SPRAY    1 spray (137 mcg total) by Nasal route 2 (two) times daily.    FLUTICASONE PROPIONATE (FLONASE) 50 MCG/ACTUATION NASAL SPRAY    1 spray (50 mcg total) by Each Nostril route 2 (two) times daily.          Follow up in about 2 weeks (around 3/20/2024) for Virtual Visit.

## 2024-03-06 NOTE — PROGRESS NOTES
Subjective:       Patient ID: Jennifer Sanches is a 33 y.o. female.    Chief Complaint: No chief complaint on file.  Last seen in primary care by Amina Yoder on 11/30/23  First time seeing me in the primary care clinic.  HPI  There were no vitals filed for this visit.  Review of Systems    Objective:      Physical Exam    Assessment & Plan:       There are no diagnoses linked to this encounter.      No follow-ups on file.

## 2024-03-06 NOTE — PATIENT INSTRUCTIONS
Anirudh Rodriguez,     If you are due for any health screening(s) below please notify me so we can arrange them to be ordered and scheduled. Most healthy patients at your age complete them, but you are free to accept or refuse.     If you can't do it, I'll definitely understand. If you can, I'd certainly appreciate it!    All of your core healthy metrics are met.      Lets manage your high blood pressure     Your blood pressure was above 140/90 today during your visit. We recommend that you schedule a nurse visit in two weeks to check your blood pressure and discuss ways to support your health goals.     You can also manage your health and record your blood pressure from the comfort of home by keeping a daily blood pressure log. These results are shared with and reviewed by your provider. Please print this form (Daily Blood Pressure Log) to assist you in keeping track of your blood pressure at home.     Schedule your nurse visit in two weeks to learn more about how to track and manage high blood pressure.    Daily Blood Pressure Log    Name:__________________________________                  Date of Birth:_________    Average Blood Pressure:  __________      Date: Time  (a.m.) Blood  Pressure: Pulse  Rate: Time  (p.m.) Blood  Pressure : Pulse  Rate:   Sample 8:37 127/83 84

## 2024-03-08 PROBLEM — A59.9 TRICHOMONAS INFECTION: Status: RESOLVED | Noted: 2022-03-04 | Resolved: 2024-03-08

## 2024-03-15 ENCOUNTER — PATIENT MESSAGE (OUTPATIENT)
Dept: FAMILY MEDICINE | Facility: CLINIC | Age: 34
End: 2024-03-15
Payer: COMMERCIAL

## 2024-03-20 ENCOUNTER — OFFICE VISIT (OUTPATIENT)
Dept: FAMILY MEDICINE | Facility: CLINIC | Age: 34
End: 2024-03-20
Payer: COMMERCIAL

## 2024-03-20 DIAGNOSIS — R73.03 PRE-DIABETES: ICD-10-CM

## 2024-03-20 DIAGNOSIS — I10 PRIMARY HYPERTENSION: Primary | ICD-10-CM

## 2024-03-20 DIAGNOSIS — E66.01 MORBID OBESITY WITH BMI OF 45.0-49.9, ADULT: ICD-10-CM

## 2024-03-20 DIAGNOSIS — R03.0 ELEVATED BLOOD PRESSURE READING IN OFFICE WITHOUT DIAGNOSIS OF HYPERTENSION: ICD-10-CM

## 2024-03-20 PROCEDURE — 3044F HG A1C LEVEL LT 7.0%: CPT | Mod: CPTII,95,, | Performed by: NURSE PRACTITIONER

## 2024-03-20 PROCEDURE — 1159F MED LIST DOCD IN RCRD: CPT | Mod: CPTII,95,, | Performed by: NURSE PRACTITIONER

## 2024-03-20 PROCEDURE — 99214 OFFICE O/P EST MOD 30 MIN: CPT | Mod: 95,,, | Performed by: NURSE PRACTITIONER

## 2024-03-20 PROCEDURE — 1160F RVW MEDS BY RX/DR IN RCRD: CPT | Mod: CPTII,95,, | Performed by: NURSE PRACTITIONER

## 2024-03-20 PROCEDURE — 4010F ACE/ARB THERAPY RXD/TAKEN: CPT | Mod: CPTII,95,, | Performed by: NURSE PRACTITIONER

## 2024-03-20 RX ORDER — LISINOPRIL 5 MG/1
5 TABLET ORAL DAILY
Qty: 90 TABLET | Refills: 1 | Status: SHIPPED | OUTPATIENT
Start: 2024-03-20 | End: 2025-03-20

## 2024-04-04 ENCOUNTER — PATIENT MESSAGE (OUTPATIENT)
Dept: ADMINISTRATIVE | Facility: HOSPITAL | Age: 34
End: 2024-04-04
Payer: COMMERCIAL

## 2024-09-06 ENCOUNTER — PATIENT MESSAGE (OUTPATIENT)
Dept: ADMINISTRATIVE | Facility: HOSPITAL | Age: 34
End: 2024-09-06
Payer: COMMERCIAL

## 2024-10-02 ENCOUNTER — OFFICE VISIT (OUTPATIENT)
Dept: OBSTETRICS AND GYNECOLOGY | Facility: CLINIC | Age: 34
End: 2024-10-02
Payer: COMMERCIAL

## 2024-10-02 ENCOUNTER — LAB VISIT (OUTPATIENT)
Dept: LAB | Facility: HOSPITAL | Age: 34
End: 2024-10-02
Attending: OBSTETRICS & GYNECOLOGY
Payer: COMMERCIAL

## 2024-10-02 VITALS — BODY MASS INDEX: 51.88 KG/M2 | DIASTOLIC BLOOD PRESSURE: 62 MMHG | SYSTOLIC BLOOD PRESSURE: 124 MMHG | WEIGHT: 293 LBS

## 2024-10-02 DIAGNOSIS — Z11.3 SCREEN FOR STD (SEXUALLY TRANSMITTED DISEASE): ICD-10-CM

## 2024-10-02 DIAGNOSIS — N64.4 BREAST TENDERNESS: ICD-10-CM

## 2024-10-02 DIAGNOSIS — Z76.89 ESTABLISHING CARE WITH NEW DOCTOR, ENCOUNTER FOR: Primary | ICD-10-CM

## 2024-10-02 LAB
HBV SURFACE AG SERPL QL IA: NORMAL
HCV AB SERPL QL IA: NORMAL
HIV 1+2 AB+HIV1 P24 AG SERPL QL IA: NORMAL
TREPONEMA PALLIDUM IGG+IGM AB [PRESENCE] IN SERUM OR PLASMA BY IMMUNOASSAY: NONREACTIVE

## 2024-10-02 PROCEDURE — 3008F BODY MASS INDEX DOCD: CPT | Mod: CPTII,S$GLB,, | Performed by: OBSTETRICS & GYNECOLOGY

## 2024-10-02 PROCEDURE — 3044F HG A1C LEVEL LT 7.0%: CPT | Mod: CPTII,S$GLB,, | Performed by: OBSTETRICS & GYNECOLOGY

## 2024-10-02 PROCEDURE — 87591 N.GONORRHOEAE DNA AMP PROB: CPT | Performed by: OBSTETRICS & GYNECOLOGY

## 2024-10-02 PROCEDURE — 3074F SYST BP LT 130 MM HG: CPT | Mod: CPTII,S$GLB,, | Performed by: OBSTETRICS & GYNECOLOGY

## 2024-10-02 PROCEDURE — 99213 OFFICE O/P EST LOW 20 MIN: CPT | Mod: S$GLB,,, | Performed by: OBSTETRICS & GYNECOLOGY

## 2024-10-02 PROCEDURE — 87340 HEPATITIS B SURFACE AG IA: CPT | Performed by: OBSTETRICS & GYNECOLOGY

## 2024-10-02 PROCEDURE — 86803 HEPATITIS C AB TEST: CPT | Performed by: OBSTETRICS & GYNECOLOGY

## 2024-10-02 PROCEDURE — 99999 PR PBB SHADOW E&M-EST. PATIENT-LVL III: CPT | Mod: PBBFAC,,, | Performed by: OBSTETRICS & GYNECOLOGY

## 2024-10-02 PROCEDURE — 4010F ACE/ARB THERAPY RXD/TAKEN: CPT | Mod: CPTII,S$GLB,, | Performed by: OBSTETRICS & GYNECOLOGY

## 2024-10-02 PROCEDURE — 87491 CHLMYD TRACH DNA AMP PROBE: CPT | Performed by: OBSTETRICS & GYNECOLOGY

## 2024-10-02 PROCEDURE — 87389 HIV-1 AG W/HIV-1&-2 AB AG IA: CPT | Performed by: OBSTETRICS & GYNECOLOGY

## 2024-10-02 PROCEDURE — 86593 SYPHILIS TEST NON-TREP QUANT: CPT | Performed by: OBSTETRICS & GYNECOLOGY

## 2024-10-02 PROCEDURE — 0352U VAGINOSIS SCREEN BY DNA PROBE: CPT | Performed by: OBSTETRICS & GYNECOLOGY

## 2024-10-02 PROCEDURE — 3078F DIAST BP <80 MM HG: CPT | Mod: CPTII,S$GLB,, | Performed by: OBSTETRICS & GYNECOLOGY

## 2024-10-02 PROCEDURE — 1159F MED LIST DOCD IN RCRD: CPT | Mod: CPTII,S$GLB,, | Performed by: OBSTETRICS & GYNECOLOGY

## 2024-10-02 PROCEDURE — 36415 COLL VENOUS BLD VENIPUNCTURE: CPT | Mod: PN | Performed by: OBSTETRICS & GYNECOLOGY

## 2024-10-02 NOTE — PROGRESS NOTES
Chief Complaint   Patient presents with    [facility]       History and Physical:  Jennifer Sanches is a 34 y.o. F who presents today to establish care. Complains of breast lump on left side. She would also like STD testing.     Patient's last menstrual period was 09/20/2024.  Contraception: Mirena 3/4/2022  Menstrual cycle: irregular due Mirena  Last Pap: 3/2024 NILM/HPV neg, results on patients phone  History of abnormal pap: yes, had treatment, denies excision.   Immunization status: up to date and documented. Status post HPV vaccination  STD testing: desires  Recent testing in march 2024 negative. HSV also tested and negative, results on patients phone.  She wants retesting due to new partner.   history of recurrent bacterial vaginosis and Trich.   Trichomonas vaginalis   Date Value Ref Range Status   04/14/2022 Positive (A) Negative Final   02/24/2022 Positive (A) Negative Final   01/03/2022 Positive (A) Negative Final   12/24/2020 Positive (A) Negative Final     Candida sp   Date Value Ref Range Status   04/14/2022 Negative Negative Final     Comment:     Candida species group includes: Candida albicans, Candida tropicalis,  Candida parapsiolosis, Elda dubliniensis     02/24/2022 Negative Negative Final     Comment:     Candida species group includes: Candida albicans, Candida tropicalis,  Candida parapsiolosis, Elda dubliniensis     01/03/2022 Negative Negative Final     Comment:     Candida species group includes: Candida albicans, Candida tropicalis,  Candida parapsiolosis, Elda dubliniensis     12/24/2020 Negative Negative Final     Elda glabrata DNA   Date Value Ref Range Status   04/14/2022 Negative Negative Final   02/24/2022 Negative Negative Final   01/03/2022 Negative Negative Final   04/22/2020 Negative Negative Final     Elda krusei DNA   Date Value Ref Range Status   04/14/2022 Negative Negative Final   02/24/2022 Negative Negative Final   01/03/2022 Negative Negative Final    2020 Negative Negative Final     Bacterial vaginosis DNA   Date Value Ref Range Status   2022 Positive (A) Negative Final   2022 Positive (A) Negative Final   2022 Positive (A) Negative Final   2020 Positive (A) Negative Final     Chlamydia, Amplified DNA   Date Value Ref Range Status   2022 Not Detected Not Detected Final   2022 Not Detected Not Detected Final   2020 Not Detected Not Detected Final   03/10/2020 Not Detected Not Detected Final     N gonorrhoeae, amplified DNA   Date Value Ref Range Status   2022 Not Detected Not Detected Final   2022 Not Detected Not Detected Final   2020 Not Detected Not Detected Final   03/10/2020 Not Detected Not Detected Final       Allergies: Review of patient's allergies indicates:  No Known Allergies  Past Medical History:   Diagnosis Date    Abnormal Pap smear     colpo    Abnormal Pap smear of cervix      Past Surgical History:   Procedure Laterality Date    WISDOM TOOTH EXTRACTION       MEDS:   Current Outpatient Medications on File Prior to Visit   Medication Sig Dispense Refill    lisinopriL (PRINIVIL,ZESTRIL) 5 MG tablet Take 1 tablet (5 mg total) by mouth once daily. 90 tablet 1     Current Facility-Administered Medications on File Prior to Visit   Medication Dose Route Frequency Provider Last Rate Last Admin    levonorgestreL (MIRENA) 20 mcg/24 hours (7 yrs) 52 mg IUD 1 Intra Uterine Device  1 Intra Uterine Device Intrauterine  Thao Camacho NP   1 Intra Uterine Device at 22 0900     OB History          1    Para        Term                AB   1    Living             SAB        IAB   1    Ectopic        Multiple        Live Births                   Social History     Socioeconomic History    Marital status: Single   Tobacco Use    Smoking status: Never    Smokeless tobacco: Never   Substance and Sexual Activity    Alcohol use: Yes     Comment: 2 x month    Drug use: No     Sexual activity: Yes     Partners: Male     Birth control/protection: None     Social Drivers of Health     Financial Resource Strain: Low Risk  (3/5/2024)    Overall Financial Resource Strain (CARDIA)     Difficulty of Paying Living Expenses: Not very hard   Food Insecurity: No Food Insecurity (3/5/2024)    Hunger Vital Sign     Worried About Running Out of Food in the Last Year: Never true     Ran Out of Food in the Last Year: Never true   Transportation Needs: No Transportation Needs (3/5/2024)    PRAPARE - Transportation     Lack of Transportation (Medical): No     Lack of Transportation (Non-Medical): No   Physical Activity: Unknown (3/5/2024)    Exercise Vital Sign     Days of Exercise per Week: 0 days   Stress: No Stress Concern Present (3/5/2024)    [employer] [place] [employer] - Occupational Stress Questionnaire     Feeling of Stress : Not at all   Housing Stability: Low Risk  (3/5/2024)    Housing Stability Vital Sign     Unable to Pay for Housing in the Last Year: No     Number of Places Lived in the Last Year: 1     Unstable Housing in the Last Year: No     Family History   Problem Relation Name Age of Onset    Hypertension Mother      Hypertension Father      No Known Problems Sister      Alzheimer's disease Paternal Grandmother      Breast cancer Neg Hx      Colon cancer Neg Hx      Ovarian cancer Neg Hx         Past medical and surgical history reviewed.   I have reviewed the patient's medical history in detail and updated the computerized patient record.    Review of System:   General: no chills, fever, night sweats, weight gain or weight loss  Breasts: no new or changing breast lumps, nipple discharge or masses.  Gastrointestinal: no abdominal pain, change in bowel habits, or black or bloody stools  Genito-Urinary: no incontinence, urinary frequency/urgency or vulvar/vaginal symptoms, pelvic pain or abnormal vaginal bleeding.    Physical Exam:   /62 (BP Location: Left arm, Patient  Position: Sitting)   Wt (!) 137.1 kg (302 lb 4 oz)   LMP 09/20/2024   BMI 51.88 kg/m²   Body mass index is 51.88 kg/m².  Constitutional: She appears alert and responsive. She appears well-developed, well-groomed, and well-nourished. No distress.  Breasts: are symmetrical.  Right breast exhibits no inverted nipple, no mass, no nipple discharge, no skin change and no tenderness.  Left breast exhibits no inverted nipple, no mass, no nipple discharge, no skin change.  Physical Exam:        Pulmonary/Chest: Right breast exhibits no inverted nipple, no mass, no nipple discharge, no skin change, no tenderness, no bleeding and no swelling. Left breast exhibits no inverted nipple, no mass, no nipple discharge, no skin change, no tenderness, no bleeding and no swelling. Breasts are symmetrical.                                Abdominal: Soft. She exhibits no distension, hernias or masses. There is no tenderness. No enlargement of liver edge or spleen.  There is no rebound and no guarding.   Genitourinary:    External rectal exam shows no thrombosed external hemorrhoids, no lesions.     Pelvic exam was performed with patient supine.   No labial fusion, and symmetrical.    There is no rash, lesion or injury on the right labia.   There is no rash, lesion or injury on the left labia.   No bleeding and no signs of injury around the vaginal introitus, urethral meatus is normal size and without prolapse or lesions, urethra well supported. The cervix is visualized with no discharge, lesions or friability.   No vaginal discharge found.    No significant Cystocele, Enterocele or rectocele, and cervix and uterus well supported.  IUD string noted   Bimanual exam:   The urethra is normal to palpation and there are no palpable vaginal wall masses.   Uterus is not deviated, not enlarged, not fixed, normal shape and not tender.   Cervix exhibits no motion tenderness.    Right adnexum displays no mass or nodularity and no tenderness.   Left  adnexum displays no mass or nodularity and no tenderness.    Assessment/Plan:   Establishing care with new doctor, encounter for    Breast tenderness  -     Mammo Digital Diagnostic Bilat with [place]; Future; Expected date: 10/02/2024  -     US Breast Bilateral Limited; Future; Expected date: 10/02/2024    Screen for STD (sexually transmitted disease)  -     POCT Vaginosis Screen by DNA Probe (Affirm)  -     C. trachomatis/N. gonorrhoeae by AMP DNA [facility]; Cervicovaginal  -     Hepatitis B Surface Antigen; Future; Expected date: 10/02/2024  -     Hepatitis C Antibody; Future; Expected date: 10/02/2024  -     HIV 1/2 Ag/Ab (4th Gen); Future; Expected date: 10/02/2024  -     Treponema Pallidium Antibodies IgG, IgM; Future; Expected date: 10/02/2024        Follow up in 1 year.

## 2024-10-07 ENCOUNTER — HOSPITAL ENCOUNTER (OUTPATIENT)
Dept: RADIOLOGY | Facility: HOSPITAL | Age: 34
Discharge: HOME | End: 2024-10-07
Attending: OBSTETRICS & GYNECOLOGY
Payer: COMMERCIAL

## 2024-10-07 DIAGNOSIS — N64.4 BREAST TENDERNESS: ICD-10-CM

## 2024-10-07 PROCEDURE — 76642 ULTRASOUND BREAST LIMITED: CPT | Mod: TC,PO,LT

## 2024-10-07 PROCEDURE — 77066 DX MAMMO INCL CAD BI: CPT | Mod: 26,,, | Performed by: RADIOLOGY

## 2024-10-07 PROCEDURE — 76642 ULTRASOUND BREAST LIMITED: CPT | Mod: 26,LT,, | Performed by: RADIOLOGY

## 2024-10-07 PROCEDURE — 77066 DX MAMMO INCL CAD BI: CPT | Mod: TC,PO

## 2024-10-07 PROCEDURE — 77062 BREAST TOMOSYNTHESIS BI: CPT | Mod: TC,PO

## 2024-10-07 PROCEDURE — 77062 BREAST TOMOSYNTHESIS BI: CPT | Mod: 26,,, | Performed by: RADIOLOGY

## 2024-11-01 NOTE — PROGRESS NOTES
Subjective:       Patient ID: Jennifer Sanches is a 33 y.o. female.    Chief Complaint: Follow up labs    Hypertension  This is a recurrent problem. The problem has been waxing and waning since onset. The problem is uncontrolled. Associated symptoms include malaise/fatigue and neck pain. Pertinent negatives include no anxiety, blurred vision, chest pain, headaches, orthopnea, palpitations, peripheral edema, PND, shortness of breath or sweats. There are no associated agents to hypertension. Risk factors for coronary artery disease include dyslipidemia and obesity. Past treatments include nothing. Compliance problems include diet and exercise.      There were no vitals filed for this visit.  Review of Systems   Constitutional:  Positive for malaise/fatigue. Negative for fatigue.   Eyes:  Negative for blurred vision.   Respiratory:  Negative for chest tightness and shortness of breath.    Cardiovascular:  Negative for chest pain, palpitations, orthopnea and PND.   Musculoskeletal:  Positive for neck pain.   Neurological:  Negative for dizziness, light-headedness and headaches.       The patient location is: [place]  The chief complaint leading to consultation is: follow up labs and elevated blood pressure    Visit type: audiovisual    Face to Face time with patient: 2:33- 2:48  24 minutes of total time spent on the encounter, which includes face to face time and non-face to face time preparing to see the patient (eg, review of tests), Obtaining and/or reviewing separately obtained history, Documenting clinical information in the electronic or other health record, Independently interpreting results (not separately reported) and communicating results to the patient/family/caregiver, or Care coordination (not separately reported).       Each patient to whom he or she provides medical services by telemedicine is:  (1) informed of the relationship between the physician and patient and the respective role of any  other health care provider with respect to management of the patient; and (2) notified that he or she may decline to receive medical services by telemedicine and may withdraw from such care at any time.    Notes:    Home BLOOD PRESSURE readings last reading 146/100  and also will consider digital hypertension for her  03/17 146/100  03/13 159/109  03/12 156/108  03/11 160/94     03/10 175/98  03/09 159/98  03/08 160/95  03/08 144/111  03/07  10:15 pm 171/102  03/07 5:08 pm 143/84  146/98  152/97    Objective:      Physical Exam  Constitutional:       Appearance: Normal appearance.   Neurological:      Mental Status: She is alert.   Psychiatric:         Attention and Perception: Attention and perception normal.         Mood and Affect: Mood and affect normal.         Speech: Speech normal.         Behavior: Behavior normal. Behavior is cooperative.         Thought Content: Thought content normal.         Cognition and Memory: Cognition and memory normal.         Judgment: Judgment normal.         Assessment & Plan:       Primary hypertension  -     lisinopriL (PRINIVIL,ZESTRIL) 5 MG tablet; Take 1 tablet (5 mg total) by mouth once daily.  Dispense: 90 tablet; Refill: 1    Pre-diabetes    Morbid obesity with BMI of 45.0-49.9, adult    Elevated blood pressure reading in office without diagnosis of hypertension    .discussed results of labs dated 03/06/2024  Discussed in detail the need for HTN medication along with therapeutic lifestyle changes  Daily exercise 30 minute  Low salt diet  She is not on NSAID but discussed OTC use is discouraged  She does have Mirena and no OCP  I have discussed need to get BLOOD PRESSURE controlled prior to seeing another provider with request for adipex because her insurance will not cover     Medication List with Changes/Refills   New Medications    LISINOPRIL (PRINIVIL,ZESTRIL) 5 MG TABLET    Take 1 tablet (5 mg total) by mouth once daily.          No follow-ups on file.     What Type Of Note Output Would You Prefer (Optional)?: Standard Output Hpi Title: Evaluation of Skin Lesions How Severe Are Your Spot(S)?: mild Have Your Spot(S) Been Treated In The Past?: has not been treated

## 2024-11-25 ENCOUNTER — PATIENT MESSAGE (OUTPATIENT)
Dept: ADMINISTRATIVE | Facility: OTHER | Age: 34
End: 2024-11-25
Payer: COMMERCIAL

## 2025-02-10 ENCOUNTER — LAB VISIT (OUTPATIENT)
Dept: LAB | Facility: HOSPITAL | Age: 35
End: 2025-02-10
Attending: OBSTETRICS & GYNECOLOGY
Payer: COMMERCIAL

## 2025-02-10 ENCOUNTER — OFFICE VISIT (OUTPATIENT)
Dept: OBSTETRICS AND GYNECOLOGY | Facility: CLINIC | Age: 35
End: 2025-02-10
Payer: COMMERCIAL

## 2025-02-10 VITALS — SYSTOLIC BLOOD PRESSURE: 120 MMHG | DIASTOLIC BLOOD PRESSURE: 74 MMHG | WEIGHT: 283.75 LBS | BODY MASS INDEX: 48.7 KG/M2

## 2025-02-10 DIAGNOSIS — Z01.411 ENCOUNTER FOR GYNECOLOGICAL EXAMINATION (GENERAL) (ROUTINE) WITH ABNORMAL FINDINGS: Primary | ICD-10-CM

## 2025-02-10 DIAGNOSIS — Z11.3 SCREEN FOR STD (SEXUALLY TRANSMITTED DISEASE): ICD-10-CM

## 2025-02-10 DIAGNOSIS — N90.89 VULVAR LESION: ICD-10-CM

## 2025-02-10 DIAGNOSIS — Z12.4 CERVICAL CANCER SCREENING: ICD-10-CM

## 2025-02-10 LAB
HBV SURFACE AG SERPL QL IA: NORMAL
HCV AB SERPL QL IA: NORMAL
HIV 1+2 AB+HIV1 P24 AG SERPL QL IA: NORMAL

## 2025-02-10 PROCEDURE — 86803 HEPATITIS C AB TEST: CPT | Performed by: OBSTETRICS & GYNECOLOGY

## 2025-02-10 PROCEDURE — 87389 HIV-1 AG W/HIV-1&-2 AB AG IA: CPT | Performed by: OBSTETRICS & GYNECOLOGY

## 2025-02-10 PROCEDURE — 87591 N.GONORRHOEAE DNA AMP PROB: CPT | Performed by: OBSTETRICS & GYNECOLOGY

## 2025-02-10 PROCEDURE — 3008F BODY MASS INDEX DOCD: CPT | Mod: CPTII,S$GLB,, | Performed by: OBSTETRICS & GYNECOLOGY

## 2025-02-10 PROCEDURE — 3078F DIAST BP <80 MM HG: CPT | Mod: CPTII,S$GLB,, | Performed by: OBSTETRICS & GYNECOLOGY

## 2025-02-10 PROCEDURE — 87340 HEPATITIS B SURFACE AG IA: CPT | Performed by: OBSTETRICS & GYNECOLOGY

## 2025-02-10 PROCEDURE — 87529 HSV DNA AMP PROBE: CPT | Performed by: OBSTETRICS & GYNECOLOGY

## 2025-02-10 PROCEDURE — 99999 PR PBB SHADOW E&M-EST. PATIENT-LVL III: CPT | Mod: PBBFAC,,, | Performed by: OBSTETRICS & GYNECOLOGY

## 2025-02-10 PROCEDURE — 81515 NFCT DS BV&VAGINITIS DNA ALG: CPT | Performed by: OBSTETRICS & GYNECOLOGY

## 2025-02-10 PROCEDURE — 3074F SYST BP LT 130 MM HG: CPT | Mod: CPTII,S$GLB,, | Performed by: OBSTETRICS & GYNECOLOGY

## 2025-02-10 PROCEDURE — 1159F MED LIST DOCD IN RCRD: CPT | Mod: CPTII,S$GLB,, | Performed by: OBSTETRICS & GYNECOLOGY

## 2025-02-10 PROCEDURE — 99395 PREV VISIT EST AGE 18-39: CPT | Mod: 25,S$GLB,, | Performed by: OBSTETRICS & GYNECOLOGY

## 2025-02-10 PROCEDURE — 87624 HPV HI-RISK TYP POOLED RSLT: CPT | Performed by: OBSTETRICS & GYNECOLOGY

## 2025-02-10 PROCEDURE — 99213 OFFICE O/P EST LOW 20 MIN: CPT | Mod: 25,S$GLB,, | Performed by: OBSTETRICS & GYNECOLOGY

## 2025-02-10 PROCEDURE — 36415 COLL VENOUS BLD VENIPUNCTURE: CPT | Mod: PO | Performed by: OBSTETRICS & GYNECOLOGY

## 2025-02-10 PROCEDURE — 86593 SYPHILIS TEST NON-TREP QUANT: CPT | Performed by: OBSTETRICS & GYNECOLOGY

## 2025-02-10 NOTE — PROGRESS NOTES
"Chief Complaint   Patient presents with    vaginal     Pt states she has a "bump" on her vaginal area    Annual Exam       History and Physical:  Jennifer Sanches is a 34 y.o. F who presents today for an annual exam. Complains of genital lump     Annual:   No LMP recorded.  Contraception: Mirena 3/4/2022  Menstrual cycle: irregular due Mirena  Last Pap: 3/2024 NILM/HPV neg, results on patients phone  History of abnormal pap: yes, had treatment, denies excision.   Immunization status: up to date and documented. Status post HPV vaccination    Genital Lump  STD testing: desires  history of recurrent bacterial vaginosis and Trich. Denies abnormal itching, odor, or vaginal discharge. Currently having a cycle.   Trichomonas vaginalis   Date Value Ref Range Status   10/02/2024 Not Detected Not Detected Final   04/14/2022 Positive (A) Negative Final   02/24/2022 Positive (A) Negative Final   01/03/2022 Positive (A) Negative Final     Candida sp   Date Value Ref Range Status   10/02/2024 Not Detected Not Detected Final     Comment:     Candida species group includes: Candida albicans, Candida tropicalis,  Candida parapsiolosis, Elda dubliniensis     04/14/2022 Negative Negative Final     Comment:     Candida species group includes: Candida albicans, Candida tropicalis,  Candida parapsiolosis, Elda dubliniensis     02/24/2022 Negative Negative Final     Comment:     Candida species group includes: Candida albicans, Candida tropicalis,  Candida parapsiolosis, Elda dubliniensis     01/03/2022 Negative Negative Final     Comment:     Candida species group includes: Candida albicans, Candida tropicalis,  Candida parapsiolosis, Elda dubliniensis       Elda glabrata DNA   Date Value Ref Range Status   04/14/2022 Negative Negative Final   02/24/2022 Negative Negative Final   01/03/2022 Negative Negative Final   04/22/2020 Negative Negative Final     Elda krusei DNA   Date Value Ref Range Status   04/14/2022 " Negative Negative Final   2022 Negative Negative Final   2022 Negative Negative Final   2020 Negative Negative Final     Bacterial vaginosis DNA   Date Value Ref Range Status   10/02/2024 DETECTED (A) Not Detected Corrected     Comment:     CORRECTED RESULT; previously reported as Detected on 10/07/2024 at   23:02.     2022 Positive (A) Negative Final   2022 Positive (A) Negative Final   2022 Positive (A) Negative Final     Chlamydia, Amplified DNA   Date Value Ref Range Status   10/02/2024 Not Detected Not Detected Final   2022 Not Detected Not Detected Final   2022 Not Detected Not Detected Final   2020 Not Detected Not Detected Final     N gonorrhoeae, amplified DNA   Date Value Ref Range Status   10/02/2024 Not Detected Not Detected Final   2022 Not Detected Not Detected Final   2022 Not Detected Not Detected Final   2020 Not Detected Not Detected Final       Allergies: Review of patient's allergies indicates:  No Known Allergies  Past Medical History:   Diagnosis Date    Abnormal Pap smear     colpo    Abnormal Pap smear of cervix      Past Surgical History:   Procedure Laterality Date    WISDOM TOOTH EXTRACTION       MEDS:   Current Outpatient Medications on File Prior to Visit   Medication Sig Dispense Refill    lisinopriL (PRINIVIL,ZESTRIL) 5 MG tablet Take 1 tablet (5 mg total) by mouth once daily. 90 tablet 1     Current Facility-Administered Medications on File Prior to Visit   Medication Dose Route Frequency Provider Last Rate Last Admin    levonorgestreL (MIRENA) 20 mcg/24 hours (7 yrs) 52 mg IUD 1 Intra Uterine Device  1 Intra Uterine Device Intrauterine  Thao Camacho NP   1 Intra Uterine Device at 22 0900     OB History          1    Para        Term                AB   1    Living             SAB        IAB   1    Ectopic        Multiple        Live Births                   Social History      Socioeconomic History    Marital status: Single   Tobacco Use    Smoking status: Never    Smokeless tobacco: Never   Substance and Sexual Activity    Alcohol use: Yes     Comment: 2 x month    Drug use: No    Sexual activity: Yes     Partners: Male     Birth control/protection: None     Social Drivers of Health     Financial Resource Strain: Low Risk  (3/5/2024)    Overall Financial Resource Strain (CARDIA)     Difficulty of Paying Living Expenses: Not very hard   Food Insecurity: No Food Insecurity (3/5/2024)    Hunger Vital Sign     Worried About Running Out of Food in the Last Year: Never true     Ran Out of Food in the Last Year: Never true   Transportation Needs: No Transportation Needs (3/5/2024)    PRAPARE - Transportation     Lack of Transportation (Medical): No     Lack of Transportation (Non-Medical): No   Physical Activity: Unknown (3/5/2024)    Exercise Vital Sign     Days of Exercise per Week: 0 days   Stress: No Stress Concern Present (3/5/2024)    [employer] [place] [employer] - Occupational Stress Questionnaire     Feeling of Stress : Not at all   Housing Stability: Low Risk  (3/5/2024)    Housing Stability Vital Sign     Unable to Pay for Housing in the Last Year: No     Number of Places Lived in the Last Year: 1     Unstable Housing in the Last Year: No     Family History   Problem Relation Name Age of Onset    Hypertension Mother      Hypertension Father      No Known Problems Sister      Alzheimer's disease Paternal Grandmother      Breast cancer Neg Hx      Colon cancer Neg Hx      Ovarian cancer Neg Hx         Past medical and surgical history reviewed.   I have reviewed the patient's medical history in detail and updated the computerized patient record.    Review of System:   General: no chills, fever, night sweats, weight gain or weight loss  Breasts: no new or changing breast lumps, nipple discharge or masses.  Gastrointestinal: no abdominal pain, change in bowel habits, or  black or bloody stools  Genito-Urinary: no incontinence, urinary frequency/urgency or vulvar/vaginal symptoms, pelvic pain or abnormal vaginal bleeding.    Physical Exam:   /74 (Patient Position: Sitting)   Wt 128.7 kg (283 lb 11.7 oz)   BMI 48.70 kg/m²   Body mass index is 48.7 kg/m².  Constitutional: She appears alert and responsive. She appears well-developed, well-groomed, and well-nourished. No distress.  Breasts: are symmetrical.  Right breast exhibits no inverted nipple, no mass, no nipple discharge, no skin change and no tenderness.  Left breast exhibits no inverted nipple, no mass, no nipple discharge, no skin change.  Abdominal: Soft. She exhibits no distension, hernias or masses. There is no tenderness. No enlargement of liver edge or spleen.  There is no rebound and no guarding.   Genitourinary:    External rectal exam shows no thrombosed external hemorrhoids, no lesions.     Pelvic exam was performed with patient supine.   No labial fusion, and symmetrical.    There is no rash, lesion or injury on the right labia.   There is no rash, lesion or injury on the left labia.   No bleeding and no signs of injury around the vaginal introitus, urethral meatus is normal size and without prolapse or lesions, urethra well supported. The cervix is visualized with no discharge, lesions or friability.   No vaginal discharge found.    No significant Cystocele, Enterocele or rectocele, and cervix and uterus well supported.  IUD string noted  One small pinpoint ulceration on left labia major, suspected ingrown hair, resolving, HSV collected for completeness.    Bimanual exam:   The urethra is normal to palpation and there are no palpable vaginal wall masses.   Uterus is not deviated, not enlarged, not fixed, normal shape and not tender.   Cervix exhibits no motion tenderness.    Right adnexum displays no mass or nodularity and no tenderness.   Left adnexum displays no mass or nodularity and no  tenderness.    Assessment/Plan:   Encounter for gynecological examination (general) (routine) with abnormal findings    Screen for STD (sexually transmitted disease)  -     POCT Vaginosis Screen by DNA Probe (Affirm)  -     C. trachomatis/N. gonorrhoeae by AMP DNA [facility]; Cervix  -     Hepatitis B Surface Antigen; Future; Expected date: 02/10/2025  -     Hepatitis C Antibody; Future; Expected date: 02/10/2025  -     HIV 1/2 Ag/Ab (4th Gen); Future; Expected date: 02/10/2025  -     Treponema Pallidium Antibodies IgG, IgM; Future; Expected date: 02/10/2025    Cervical cancer screening    Vulvar lesion          Follow up in 1 year.

## 2025-02-11 LAB — TREPONEMA PALLIDUM IGG+IGM AB [PRESENCE] IN SERUM OR PLASMA BY IMMUNOASSAY: NONREACTIVE

## 2025-02-12 ENCOUNTER — TELEPHONE (OUTPATIENT)
Dept: OBSTETRICS AND GYNECOLOGY | Facility: CLINIC | Age: 35
End: 2025-02-12
Payer: COMMERCIAL

## 2025-02-12 DIAGNOSIS — N76.0 BV (BACTERIAL VAGINOSIS): ICD-10-CM

## 2025-02-12 DIAGNOSIS — N76.0 RECURRENT VAGINITIS: ICD-10-CM

## 2025-02-12 DIAGNOSIS — A74.9 CHLAMYDIA: Primary | ICD-10-CM

## 2025-02-12 DIAGNOSIS — B96.89 BV (BACTERIAL VAGINOSIS): ICD-10-CM

## 2025-02-12 LAB
BACTERIAL VAGINOSIS DNA: DETECTED
C TRACH DNA SPEC QL NAA+PROBE: DETECTED
CANDIDA GLABRATA/KRUSEI: NOT DETECTED
CANDIDA RRNA VAG QL PROBE: NOT DETECTED
N GONORRHOEA DNA SPEC QL NAA+PROBE: NOT DETECTED
TRICHOMONAS VAGINALIS: NOT DETECTED

## 2025-02-12 RX ORDER — METRONIDAZOLE 7.5 MG/G
1 GEL VAGINAL
Qty: 70 G | Refills: 3 | Status: SHIPPED | OUTPATIENT
Start: 2025-02-20 | End: 2025-06-04

## 2025-02-12 RX ORDER — DOXYCYCLINE 100 MG/1
100 CAPSULE ORAL 2 TIMES DAILY
Qty: 14 CAPSULE | Refills: 0 | Status: SHIPPED | OUTPATIENT
Start: 2025-02-12 | End: 2025-02-19

## 2025-02-12 RX ORDER — CLINDAMYCIN PHOSPHATE 20 MG/G
CREAM VAGINAL NIGHTLY
Qty: 40 G | Refills: 0 | Status: SHIPPED | OUTPATIENT
Start: 2025-02-12 | End: 2025-02-19

## 2025-02-12 NOTE — TELEPHONE ENCOUNTER
----- Message from Judy Urrutia MD sent at 2/12/2025 11:33 AM CST -----  Positive for Chlamydia. This is a sexually transmitted infection. A prescription was sent to her pharmacy.   Your partner should get tested and treated, we can call her partner in a prescription if needed. Refrain from intercourse for two weeks after they are both treated. It is recommended to get full STD testing, if not already done. We can also perform a test of cure in three months to be sure the infection is gone.    Your results showed a bacterial infection. A prescription has been sent to your pharmacy.   Following treatment, bacterial vaginosis may recur in up to 30% of patients within 3 months and 58% within 12 months. Potential factors associated with recurrent bacterial vaginosis include douching, frequent sexual activity, a previous history of bacterial vaginosis, persistence of pathogenic bacteria, or failure to reestablish a lactobacillus-predominant vaginal dena. Patients identified to have at least three documented, separate episodes in 1 year meet the criteria for recurrent bacterial vaginosis and may be offered twice weekly suppressive metronidazole gel for 16 weeks after treatment for the acute episode. Prescription sent for vaginal Clind for 7 days, then start vaginal MetroGel twice weekly for 16 weeks.

## 2025-02-12 NOTE — TELEPHONE ENCOUNTER
Pt aware , called pt to go over results ,pt states she understood and declined for RX to get sent to pharmacy for partner.

## 2025-02-13 LAB
HSV1 DNA SPEC QL NAA+PROBE: NEGATIVE
HSV2 DNA SPEC QL NAA+PROBE: NEGATIVE
SPECIMEN SOURCE: NORMAL

## 2025-02-17 ENCOUNTER — RESULTS FOLLOW-UP (OUTPATIENT)
Dept: OBSTETRICS AND GYNECOLOGY | Facility: CLINIC | Age: 35
End: 2025-02-17

## 2025-02-21 ENCOUNTER — PATIENT OUTREACH (OUTPATIENT)
Dept: ADMINISTRATIVE | Facility: HOSPITAL | Age: 35
End: 2025-02-21
Payer: COMMERCIAL

## 2025-02-22 ENCOUNTER — PATIENT MESSAGE (OUTPATIENT)
Dept: OBSTETRICS AND GYNECOLOGY | Facility: CLINIC | Age: 35
End: 2025-02-22
Payer: COMMERCIAL

## 2025-02-23 ENCOUNTER — OFFICE VISIT (OUTPATIENT)
Dept: URGENT CARE | Facility: CLINIC | Age: 35
End: 2025-02-23
Payer: COMMERCIAL

## 2025-02-23 VITALS
SYSTOLIC BLOOD PRESSURE: 134 MMHG | HEIGHT: 64 IN | DIASTOLIC BLOOD PRESSURE: 84 MMHG | OXYGEN SATURATION: 97 % | WEIGHT: 283 LBS | BODY MASS INDEX: 48.32 KG/M2 | HEART RATE: 80 BPM | RESPIRATION RATE: 20 BRPM | TEMPERATURE: 97 F

## 2025-02-23 DIAGNOSIS — M54.50 ACUTE BILATERAL LOW BACK PAIN WITHOUT SCIATICA: Primary | ICD-10-CM

## 2025-02-23 DIAGNOSIS — M54.9 BACK PAIN, UNSPECIFIED BACK LOCATION, UNSPECIFIED BACK PAIN LATERALITY, UNSPECIFIED CHRONICITY: ICD-10-CM

## 2025-02-23 LAB
B-HCG UR QL: NEGATIVE
BILIRUBIN, UA POC OHS: NEGATIVE
BLOOD, UA POC OHS: NEGATIVE
CLARITY, UA POC OHS: ABNORMAL
COLOR, UA POC OHS: YELLOW
CTP QC/QA: YES
GLUCOSE, UA POC OHS: NEGATIVE
KETONES, UA POC OHS: NEGATIVE
LEUKOCYTES, UA POC OHS: NEGATIVE
NITRITE, UA POC OHS: NEGATIVE
PH, UA POC OHS: 6
PROTEIN, UA POC OHS: 100
SPECIFIC GRAVITY, UA POC OHS: >=1.03
UROBILINOGEN, UA POC OHS: 0.2

## 2025-02-23 PROCEDURE — 99213 OFFICE O/P EST LOW 20 MIN: CPT | Mod: S$GLB,,, | Performed by: INTERNAL MEDICINE

## 2025-02-23 PROCEDURE — 81025 URINE PREGNANCY TEST: CPT | Mod: S$GLB,,, | Performed by: INTERNAL MEDICINE

## 2025-02-23 PROCEDURE — 81003 URINALYSIS AUTO W/O SCOPE: CPT | Mod: QW,S$GLB,, | Performed by: INTERNAL MEDICINE

## 2025-02-23 RX ORDER — IBUPROFEN 800 MG/1
800 TABLET ORAL EVERY 8 HOURS PRN
Qty: 21 TABLET | Refills: 0 | Status: SHIPPED | OUTPATIENT
Start: 2025-02-23 | End: 2025-03-02

## 2025-02-23 RX ORDER — METHOCARBAMOL 750 MG/1
750 TABLET, FILM COATED ORAL EVERY 8 HOURS PRN
Qty: 21 TABLET | Refills: 0 | Status: SHIPPED | OUTPATIENT
Start: 2025-02-23 | End: 2025-03-02

## 2025-02-23 NOTE — PROGRESS NOTES
"Subjective:      Patient ID: Jennifer Sanches is a 34 y.o. female.    Vitals:  height is 5' 4" (1.626 m) and weight is 128.4 kg (283 lb). Her axillary temperature is 97.1 °F (36.2 °C). Her blood pressure is 134/84 and her pulse is 80. Her respiration is 20 and oxygen saturation is 97%.     Chief Complaint: Back Pain    34/F, Symptoms began 2/20/25. The includes sharp pain in her middle and lower back. She has been taking Goody's and Ibuprofen with minimal relief.  No urine or fecal incontinence.  Denies any radiation to lower extremities.  No numbness or weakness in lower extremities.    Back Pain  This is a new problem. The current episode started in the past 7 days. The problem occurs constantly. The problem has been gradually worsening since onset. The quality of the pain is described as shooting and stabbing. The pain radiates to the right thigh and left thigh. The pain is at a severity of 9/10. The pain is severe. The pain is The same all the time. The symptoms are aggravated by bending, standing and sitting. Stiffness is present All day. Treatments tried: See above. The treatment provided mild relief.       Musculoskeletal:  Positive for back pain.      Objective:     Physical Exam   Constitutional: She is oriented to person, place, and time. She appears well-developed. She is cooperative.  Non-toxic appearance. She does not appear ill. No distress.   HENT:   Head: Normocephalic and atraumatic.   Ears:   Right Ear: Hearing, tympanic membrane, external ear and ear canal normal.   Left Ear: Hearing, tympanic membrane, external ear and ear canal normal.   Nose: Nose normal. No mucosal edema, rhinorrhea or nasal deformity. No epistaxis. Right sinus exhibits no maxillary sinus tenderness and no frontal sinus tenderness. Left sinus exhibits no maxillary sinus tenderness and no frontal sinus tenderness.   Mouth/Throat: Uvula is midline, oropharynx is clear and moist and mucous membranes are normal. No trismus in " the jaw. Normal dentition. No uvula swelling. No oropharyngeal exudate, posterior oropharyngeal edema or posterior oropharyngeal erythema.   Eyes: Conjunctivae and lids are normal. No scleral icterus.   Neck: Trachea normal and phonation normal. Neck supple. No edema present. No erythema present. No neck rigidity present.   Cardiovascular: Normal rate, regular rhythm, normal heart sounds and normal pulses.   Pulmonary/Chest: Effort normal and breath sounds normal. No respiratory distress. She has no decreased breath sounds. She has no rhonchi.   Abdominal: Normal appearance.   Musculoskeletal: Normal range of motion.         General: No deformity. Normal range of motion.      Lumbar back: She exhibits tenderness and spasm. She exhibits no swelling and no deformity.        Back:    Neurological: She is alert and oriented to person, place, and time. She exhibits normal muscle tone. Coordination normal.   Skin: Skin is warm, dry, intact, not diaphoretic and not pale.   Psychiatric: Her speech is normal and behavior is normal. Judgment and thought content normal.   Nursing note and vitals reviewed.      Assessment:     1. Acute bilateral low back pain without sciatica    2. Back pain, unspecified back location, unspecified back pain laterality, unspecified chronicity        Plan:       Acute bilateral low back pain without sciatica  -     ibuprofen (ADVIL,MOTRIN) 800 MG tablet; Take 1 tablet (800 mg total) by mouth every 8 (eight) hours as needed for Pain.  Dispense: 21 tablet; Refill: 0  -     methocarbamoL (ROBAXIN) 750 MG Tab; Take 1 tablet (750 mg total) by mouth every 8 (eight) hours as needed (pain).  Dispense: 21 tablet; Refill: 0    Back pain, unspecified back location, unspecified back pain laterality, unspecified chronicity  -     POCT Urinalysis(Instrument)  -     POCT urine pregnancy      Patient Instructions   If your condition worsens we recommend that you receive another evaluation at the emergency room  immediately or contact your primary medical clinics after hours call service to discuss your concerns. You must understand that you've received an Urgent Care treatment only and that you may be released before all of your medical problems are known or treated. You, the patient, will arrange for follow up care as instructed.  Drink plenty of Fluids  Wash hands frequently using mild antibacterial soap lathering for at least 15 seconds then rinse  Get plenty of Rest  Follow up in 1-2 weeks with Primary Care physician if not significantly better.   If you are not allergic please take Tylenol every 4-6 hours as needed and/or Ibuprofen every 6-8 hours as needed, over the counter for pain or fever.

## 2025-03-10 ENCOUNTER — PATIENT MESSAGE (OUTPATIENT)
Dept: ADMINISTRATIVE | Facility: HOSPITAL | Age: 35
End: 2025-03-10
Payer: COMMERCIAL

## 2025-03-12 DIAGNOSIS — R73.03 PREDIABETES: ICD-10-CM

## 2025-03-12 DIAGNOSIS — I10 PRIMARY HYPERTENSION: ICD-10-CM

## 2025-05-23 ENCOUNTER — OFFICE VISIT (OUTPATIENT)
Dept: PRIMARY CARE CLINIC | Facility: CLINIC | Age: 35
End: 2025-05-23
Payer: COMMERCIAL

## 2025-05-23 VITALS
RESPIRATION RATE: 17 BRPM | BODY MASS INDEX: 50.02 KG/M2 | DIASTOLIC BLOOD PRESSURE: 84 MMHG | SYSTOLIC BLOOD PRESSURE: 134 MMHG | HEART RATE: 70 BPM | WEIGHT: 293 LBS | HEIGHT: 64 IN | OXYGEN SATURATION: 99 %

## 2025-05-23 DIAGNOSIS — E66.01 MORBID OBESITY WITH BMI OF 45.0-49.9, ADULT: ICD-10-CM

## 2025-05-23 DIAGNOSIS — I10 PRIMARY HYPERTENSION: ICD-10-CM

## 2025-05-23 DIAGNOSIS — Z20.822 EXPOSURE TO CONFIRMED CASE OF COVID-19: ICD-10-CM

## 2025-05-23 DIAGNOSIS — Z00.00 ANNUAL PHYSICAL EXAM: Primary | ICD-10-CM

## 2025-05-23 DIAGNOSIS — Z13.220 SCREENING FOR LIPID DISORDERS: ICD-10-CM

## 2025-05-23 DIAGNOSIS — R05.1 ACUTE COUGH: ICD-10-CM

## 2025-05-23 LAB
CTP QC/QA: YES
SARS-COV-2 RDRP RESP QL NAA+PROBE: NEGATIVE

## 2025-05-23 RX ORDER — AMLODIPINE BESYLATE 2.5 MG/1
2.5 TABLET ORAL DAILY
Qty: 90 TABLET | Refills: 3 | Status: SHIPPED | OUTPATIENT
Start: 2025-05-23 | End: 2026-05-23

## 2025-05-23 RX ORDER — PROMETHAZINE HYDROCHLORIDE AND DEXTROMETHORPHAN HYDROBROMIDE 6.25; 15 MG/5ML; MG/5ML
5 SYRUP ORAL NIGHTLY PRN
Qty: 100 ML | Refills: 0 | Status: SHIPPED | OUTPATIENT
Start: 2025-05-23 | End: 2025-06-02

## 2025-05-23 NOTE — PROGRESS NOTES
<[facility]  Surgery Department  Operative Note    SUMMARY     Date of Procedure: 11/8/2019     Procedure: Procedure(s) (LRB):  CATHETERIZATION, HEART, LEFT (Left)     Surgeon(s) and Role:     * Toby Morrow MD - Primary    Assisting Surgeon: None    Pre-Operative Diagnosis: CAD S/P percutaneous coronary angioplasty [I25.10, Z98.61]  Unstable angina [I20.0]    Post-Operative Diagnosis: Post-Op Diagnosis Codes:     * CAD S/P percutaneous coronary angioplasty [I25.10, Z98.61]     * Unstable angina [I20.0]    Anesthesia: RN IV Sedation    Technical Procedures Used: coronary angiogram    Description of the Findings of the Procedure: coronary angiogram, dx: angina, Finding: patent RCA stent, small vessel ds OM, no changes, vasospasm noted, add Ca channel blockers    Significant Surgical Tasks Conducted by the Assistant(s), if Applicable: none    Complications: No    Estimated Blood Loss (EBL): < 50 cc           Implants: * No implants in log *    Specimens:   Specimen (12h ago, onward)    None                  Condition: Good    Disposition: PACU - hemodynamically stable.    Attestation: I was present and scrubbed for the entire procedure.   Patient ID: Jennifer Sanches is a 35 y.o. female.    Chief Complaint: Annual Exam  Last seen in primary care by me on 03/20/2024  Seen in ED on 05/13/2025 with chest wall pain and coughing hard  History of Present Illness    CHIEF COMPLAINT:  Patient presents today with acute cough and sore throat. States she was around positive Covid family member.    HISTORY OF PRESENT ILLNESS:  She reports new onset cough starting the night before the visit, accompanied by difficulty breathing when lying down, particularly at night. She used prescribed Flonase with minimal improvement in symptoms. She denies experiencing any symptoms prior to the recent onset of cough.    HYPERTENSION:  She reports inconsistent use of Lisinopril prior, taking it only once or twice per week. She previously experienced severe cough from taking this blood pressure medication that resulted in an emergency room visit due to pulled chest muscle and concern for possible heart attack. Home blood pressure monitoring with wrist cuff shows typical systolic readings in the 130s.      ROS:  General: -fever, -chills, -fatigue, -weight gain, -weight loss  Eyes: -vision changes, -redness, -discharge  ENT: -ear pain, -nasal congestion, +sore throat  Cardiovascular: -chest pain, -palpitations, -lower extremity edema  Respiratory: +cough, -shortness of breath, +shortness of breath lying down, -wheezing  Gastrointestinal: -abdominal pain, -nausea, -vomiting, -diarrhea, -constipation, -blood in stool, stool color changes  Genitourinary: -dysuria, -hematuria, -frequency  Musculoskeletal: -joint pain, -muscle pain  Skin: -rash, -lesion  Neurological: -headache, -dizziness, -numbness, -tingling  Psychiatric: -anxiety, -depression, -sleep difficulty         Physical Exam    Vitals: Blood pressure: 134/84.  General: No acute distress. Well-developed. Well-nourished.  Eyes: EOMI. Sclerae anicteric.  HENT: Normocephalic. Atraumatic. Nares patent. Moist oral mucosa. All  normal.  Ears: Bilateral TMs clear. Bilateral EACs clear.  Cardiovascular: Regular rate. Regular rhythm. No murmurs. No rubs. No gallops. Normal S1, S2.  Respiratory: Normal respiratory effort. Clear to auscultation bilaterally. No rales. No rhonchi. No wheezing.  Abdomen: Soft. Non-tender. Non-distended. Normoactive bowel sounds.  Musculoskeletal: No  obvious deformity.  Extremities: No lower extremity edema.  Neurological: Alert & oriented x3. No slurred speech. Normal gait.  Psychiatric: Normal mood. Normal affect. Good insight. Good judgment.  Skin: Warm. Dry. No rash.         Assessment & Plan    I10 Essential (primary) hypertension  R07.0 Pain in throat  Z20.822 Contact with and (suspected) exposure to COVID-19    ACUTE BRONCHITIS:  - Assessed patient's symptoms of chest congestion (worse when lying down), and significant coughing, especially at night.  - Examination revealed clear heart and lung sounds with normal ears, nose, and throat.  - Chest XR from ED at [facility] on 05/13/2025 showed normal findings  - Prescribed promethazine cough syrup  for cough management    ESSENTIAL HYPERTENSION:  - Current blood pressure is 134/84, with goal to achieve systolic pressure under 130.  - Discontinued Lisinopril due to ACE inhibitor-induced cough.  - Plan to switch to a different antihypertensive medication due to cough side effects from current medications.  - Advised patient to obtain an arm cuff for more accurate readings instead of wrist cuff and continue home BP monitoring.  - Ordered lipid panel.    THROAT PAIN:  - Patient presents with sore throat and coughing.  - Examination showed normal ears, nose, and throat.      DRUG INTOLERANCE:  - Patient reports cough as side effect of Lisinopril.  - Previously discontinued Lisinopril due to history of ACE inhibitor-induced cough.  - Will switch to different antihypertensive medication to address these side effects.    COVID-19 EXPOSURE:  - COVID test result is negative;  COVID-19 ruled out.    FOLLOW-UP:  - Patient instructed to contact office if symptoms worsen or new concerns arise.          BP Readings from Last 3 Encounters:   05/23/25 134/84   02/23/25 134/84   02/10/25 120/74      Medication List with Changes/Refills   New Medications    AMLODIPINE (NORVASC) 2.5 MG TABLET    Take 1 tablet (2.5 mg total) by mouth once daily.    PROMETHAZINE-DEXTROMETHORPHAN (PROMETHAZINE-DM) 6.25-15 MG/5 ML SYRP    Take 5 mLs by mouth nightly as needed (cough).   Current Medications    METRONIDAZOLE (METROGEL) 0.75 % (37.5MG/5 GRAM) VAGINAL GEL    Place 1 applicator vaginally twice a week.   Discontinued Medications    LISINOPRIL (PRINIVIL,ZESTRIL) 5 MG TABLET    Take 1 tablet (5 mg total) by mouth once daily.        Follow up in about 3 months (around 8/23/2025).    This note was generated with the assistance of ambient listening technology. Verbal consent was obtained by the patient and accompanying visitor(s) for the recording of patient appointment to facilitate this note. I attest to having reviewed and edited the generated note for accuracy, though some syntax or spelling errors may persist. Please contact the author of this note for any clarification.

## 2025-06-03 ENCOUNTER — LAB VISIT (OUTPATIENT)
Dept: PRIMARY CARE CLINIC | Facility: CLINIC | Age: 35
End: 2025-06-03
Payer: COMMERCIAL

## 2025-06-03 DIAGNOSIS — Z13.220 SCREENING FOR LIPID DISORDERS: ICD-10-CM

## 2025-06-03 DIAGNOSIS — I10 PRIMARY HYPERTENSION: ICD-10-CM

## 2025-06-03 DIAGNOSIS — E66.01 MORBID OBESITY WITH BMI OF 45.0-49.9, ADULT: ICD-10-CM

## 2025-06-03 DIAGNOSIS — R73.03 PREDIABETES: ICD-10-CM

## 2025-06-03 LAB
ALBUMIN SERPL BCP-MCNC: 3.3 G/DL (ref 3.5–5.2)
ALP SERPL-CCNC: 57 UNIT/L (ref 40–150)
ALT SERPL W/O P-5'-P-CCNC: 15 UNIT/L (ref 10–44)
ANION GAP (OHS): 7 MMOL/L (ref 8–16)
AST SERPL-CCNC: 13 UNIT/L (ref 11–45)
BILIRUB SERPL-MCNC: 0.4 MG/DL (ref 0.1–1)
BUN SERPL-MCNC: 13 MG/DL (ref 6–20)
CALCIUM SERPL-MCNC: 8.5 MG/DL (ref 8.7–10.5)
CHLORIDE SERPL-SCNC: 105 MMOL/L (ref 95–110)
CHOLEST SERPL-MCNC: 195 MG/DL (ref 120–199)
CHOLEST/HDLC SERPL: 3.5 {RATIO} (ref 2–5)
CO2 SERPL-SCNC: 25 MMOL/L (ref 23–29)
CREAT SERPL-MCNC: 0.8 MG/DL (ref 0.5–1.4)
EAG (OHS): 111 MG/DL (ref 68–131)
GFR SERPLBLD CREATININE-BSD FMLA CKD-EPI: >60 ML/MIN/1.73/M2
GLUCOSE SERPL-MCNC: 84 MG/DL (ref 70–110)
HBA1C MFR BLD: 5.5 % (ref 4–5.6)
HDLC SERPL-MCNC: 56 MG/DL (ref 40–75)
HDLC SERPL: 28.7 % (ref 20–50)
LDLC SERPL CALC-MCNC: 117.6 MG/DL (ref 63–159)
NONHDLC SERPL-MCNC: 139 MG/DL
POTASSIUM SERPL-SCNC: 4.2 MMOL/L (ref 3.5–5.1)
PROT SERPL-MCNC: 7.2 GM/DL (ref 6–8.4)
SODIUM SERPL-SCNC: 137 MMOL/L (ref 136–145)
TRIGL SERPL-MCNC: 107 MG/DL (ref 30–150)

## 2025-06-03 PROCEDURE — 80061 LIPID PANEL: CPT | Performed by: NURSE PRACTITIONER

## 2025-06-03 PROCEDURE — 80053 COMPREHEN METABOLIC PANEL: CPT | Performed by: NURSE PRACTITIONER

## 2025-06-03 PROCEDURE — 83036 HEMOGLOBIN GLYCOSYLATED A1C: CPT | Performed by: NURSE PRACTITIONER

## 2025-08-12 PROBLEM — I10 PRIMARY HYPERTENSION: Status: ACTIVE | Noted: 2025-08-12

## 2025-08-19 ENCOUNTER — OFFICE VISIT (OUTPATIENT)
Dept: INTERNAL MEDICINE | Facility: CLINIC | Age: 35
End: 2025-08-19
Payer: COMMERCIAL

## 2025-08-19 ENCOUNTER — PATIENT MESSAGE (OUTPATIENT)
Dept: INTERNAL MEDICINE | Facility: CLINIC | Age: 35
End: 2025-08-19

## 2025-08-19 DIAGNOSIS — E66.01 OBESITY, MORBID, BMI 50 OR HIGHER: Primary | ICD-10-CM

## 2025-08-19 PROCEDURE — 99499 UNLISTED E&M SERVICE: CPT | Mod: 95,,,

## 2025-08-19 RX ORDER — SEMAGLUTIDE 0.5 MG/.5ML
0.5 INJECTION, SOLUTION SUBCUTANEOUS
Qty: 2 ML | Refills: 0 | Status: ACTIVE | OUTPATIENT
Start: 2025-08-19

## 2025-08-19 RX ORDER — SEMAGLUTIDE 0.25 MG/.5ML
0.25 INJECTION, SOLUTION SUBCUTANEOUS
Qty: 2 ML | Refills: 0 | Status: ACTIVE | OUTPATIENT
Start: 2025-08-19

## 2025-08-19 RX ORDER — SEMAGLUTIDE 1 MG/.5ML
1 INJECTION, SOLUTION SUBCUTANEOUS
Qty: 2 ML | Refills: 0 | Status: ACTIVE | OUTPATIENT
Start: 2025-08-19